# Patient Record
Sex: FEMALE | Race: WHITE | NOT HISPANIC OR LATINO | Employment: FULL TIME | URBAN - METROPOLITAN AREA
[De-identification: names, ages, dates, MRNs, and addresses within clinical notes are randomized per-mention and may not be internally consistent; named-entity substitution may affect disease eponyms.]

---
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
## 2020-12-18 ENCOUNTER — HISTORICAL (OUTPATIENT)
Dept: ADMINISTRATIVE | Facility: HOSPITAL | Age: 20
End: 2020-12-18

## 2020-12-18 LAB
ALBUMIN SERPL-MCNC: 4.9 GM/DL (ref 3.5–5)
ALBUMIN/GLOB SERPL: 1.4 RATIO (ref 1.1–2)
ALP SERPL-CCNC: 78 UNIT/L (ref 40–150)
ALT SERPL-CCNC: 15 UNIT/L (ref 0–55)
AST SERPL-CCNC: 21 UNIT/L (ref 5–34)
BILIRUB SERPL-MCNC: 0.4 MG/DL
BILIRUBIN DIRECT+TOT PNL SERPL-MCNC: 0.2 MG/DL (ref 0–0.5)
BILIRUBIN DIRECT+TOT PNL SERPL-MCNC: 0.2 MG/DL (ref 0–0.8)
BUN SERPL-MCNC: 9.9 MG/DL (ref 7–18.7)
CALCIUM SERPL-MCNC: 11.1 MG/DL (ref 8.4–10.2)
CHLORIDE SERPL-SCNC: 106 MMOL/L (ref 98–107)
CO2 SERPL-SCNC: 27 MMOL/L (ref 22–29)
CREAT SERPL-MCNC: 0.7 MG/DL (ref 0.55–1.02)
ERYTHROCYTE [DISTWIDTH] IN BLOOD BY AUTOMATED COUNT: 11.9 % (ref 11.5–17)
GLOBULIN SER-MCNC: 3.5 GM/DL (ref 2.4–3.5)
GLUCOSE SERPL-MCNC: 94 MG/DL (ref 74–100)
HCT VFR BLD AUTO: 46.6 % (ref 37–47)
HGB BLD-MCNC: 14.8 GM/DL (ref 12–16)
MCH RBC QN AUTO: 28.8 PG (ref 27–31)
MCHC RBC AUTO-ENTMCNC: 31.8 GM/DL (ref 33–36)
MCV RBC AUTO: 90.8 FL (ref 80–94)
PLATELET # BLD AUTO: 370 X10(3)/MCL (ref 130–400)
PMV BLD AUTO: 8.9 FL (ref 9.4–12.4)
POTASSIUM SERPL-SCNC: 4.2 MMOL/L (ref 3.5–5.1)
PROT SERPL-MCNC: 8.4 GM/DL (ref 6.4–8.3)
RBC # BLD AUTO: 5.13 X10(6)/MCL (ref 4.2–5.4)
SODIUM SERPL-SCNC: 141 MMOL/L (ref 136–145)
TSH SERPL-ACNC: 0.93 UIU/ML (ref 0.35–4.94)
WBC # SPEC AUTO: 8.6 X10(3)/MCL (ref 4.5–11.5)

## 2021-05-28 ENCOUNTER — HISTORICAL (OUTPATIENT)
Dept: ADMINISTRATIVE | Facility: HOSPITAL | Age: 21
End: 2021-05-28

## 2021-05-28 LAB
APPEARANCE, UA: CLEAR
BACTERIA #/AREA URNS AUTO: ABNORMAL /HPF
BILIRUB SERPL-MCNC: NEGATIVE MG/DL
BILIRUB UR QL STRIP: NEGATIVE
BLOOD URINE, POC: NORMAL
CLARITY, POC UA: CLEAR
COLOR UR: NORMAL
COLOR, POC UA: YELLOW
GLUCOSE (UA): NEGATIVE
GLUCOSE UR QL STRIP: NEGATIVE
HGB UR QL STRIP: NEGATIVE
HYALINE CASTS #/AREA URNS LPF: ABNORMAL /LPF
KETONES UR QL STRIP: NEGATIVE
KETONES UR QL STRIP: NEGATIVE
LEUKOCYTE EST, POC UA: NEGATIVE
LEUKOCYTE ESTERASE UR QL STRIP: NEGATIVE
NITRITE UR QL STRIP: NEGATIVE
NITRITE, POC UA: NEGATIVE
PH UR STRIP: 7 [PH] (ref 4.5–8)
PH, POC UA: 7
POC BETA-HCG (QUAL): NEGATIVE
PROT UR QL STRIP: NEGATIVE
PROTEIN, POC: NEGATIVE
RBC #/AREA URNS AUTO: ABNORMAL /HPF
SP GR UR STRIP: 1.02 (ref 1–1.03)
SPECIFIC GRAVITY, POC UA: 1.03
SQUAMOUS #/AREA URNS LPF: >100 /LPF
UROBILINOGEN UR STRIP-ACNC: NORMAL
UROBILINOGEN, POC UA: NORMAL
WBC #/AREA URNS AUTO: ABNORMAL /HPF

## 2021-05-31 LAB — FINAL CULTURE: NO GROWTH

## 2021-06-14 ENCOUNTER — HISTORICAL (OUTPATIENT)
Dept: ADMINISTRATIVE | Facility: HOSPITAL | Age: 21
End: 2021-06-14

## 2021-06-14 LAB
APPEARANCE, UA: CLEAR
BACTERIA SPEC CULT: NORMAL /HPF
BILIRUB UR QL STRIP: NEGATIVE
COLOR UR: YELLOW
GLUCOSE (UA): NEGATIVE
HGB UR QL STRIP: NEGATIVE
KETONES UR QL STRIP: NEGATIVE
LEUKOCYTE ESTERASE UR QL STRIP: NEGATIVE
NITRITE UR QL STRIP: NEGATIVE
PH UR STRIP: 6.5 [PH] (ref 5–9)
PROT UR QL STRIP: NEGATIVE
RBC #/AREA URNS HPF: NORMAL /[HPF]
SP GR UR STRIP: 1.02 (ref 1–1.03)
SQUAMOUS EPITHELIAL, UA: NORMAL /HPF (ref 0–4)
UROBILINOGEN UR STRIP-ACNC: 1
WBC #/AREA URNS HPF: NORMAL /[HPF]

## 2021-06-16 LAB — FINAL CULTURE: NORMAL

## 2021-09-28 ENCOUNTER — HISTORICAL (OUTPATIENT)
Dept: LAB | Facility: HOSPITAL | Age: 21
End: 2021-09-28

## 2021-09-28 LAB — SARS-COV-2 RNA RESP QL NAA+PROBE: NOT DETECTED

## 2021-10-12 ENCOUNTER — HISTORICAL (OUTPATIENT)
Dept: ADMINISTRATIVE | Facility: HOSPITAL | Age: 21
End: 2021-10-12

## 2021-12-17 LAB
INFLUENZA A ANTIGEN, POC: NEGATIVE
INFLUENZA B ANTIGEN, POC: NEGATIVE
RAPID GROUP A STREP (OHS): NEGATIVE
SARS-COV-2 RNA RESP QL NAA+PROBE: NEGATIVE

## 2022-04-11 ENCOUNTER — HISTORICAL (OUTPATIENT)
Dept: ADMINISTRATIVE | Facility: HOSPITAL | Age: 22
End: 2022-04-11

## 2022-04-25 VITALS
HEIGHT: 63 IN | DIASTOLIC BLOOD PRESSURE: 83 MMHG | BODY MASS INDEX: 21.09 KG/M2 | OXYGEN SATURATION: 98 % | SYSTOLIC BLOOD PRESSURE: 126 MMHG | WEIGHT: 119.06 LBS

## 2022-05-05 NOTE — HISTORICAL [facility]
This is a historical note converted from [place]. Formatting and pictures may have been removed.  Please reference Clint for original formatting and attached multimedia. Chief Complaint  low back and ab discomfort x 6 days discolored red mucus in urine, low appetite also  History of Present Illness  20-year-old female presents urgent care?with several days of vague?abdominal discomfort,?noncolicky,?occasional low back pain that is nonpositional, thinks she?saw red mucus in her urine?recently, feels fatigued.? She is a  at the main campus.? States she was late on a Depo-Provera shot?recently,?had a negative home UPT?1 week ago.? She denies pelvic discomfort, no vaginal bleeding or discharge, no vomiting or diarrhea,?no dyspepsia?or pyrosis.? No cardiorespiratory symptoms.? She has had no fever.? States she is low risk for STD.  Chart reviewed  Review of Systems  Constitutional: negative except as stated in HPI  Eye: negative except as stated in HPI  ENT: negative except as stated in HPI  Respiratory: negative except as stated in HPI  Cardiovascular: negative except as stated in HPI  Gastrointestinal: negative except as stated in HPI  Genitourinary: negative except as stated in HPI  Hema/Lymph: negative except as stated in HPI  Endocrine: negative except as stated in HPI  Immunologic: negative except as stated in HPI  Musculoskeletal: negative except as stated in HPI  Integumentary: negative except as stated in HPI  Neurologic: negative except as stated in HPI  ?  Physical Exam  Vitals & Measurements  T:?37.0? ?C (Oral)? HR:?84(Peripheral)? BP:?124/77? SpO2:?98%?  HT:?160.00?cm? WT:?53.900?kg? BMI:?21.05?  VITAL SIGNS: ?Reviewed. ? ?  GENERAL:?In no apparent distress  HEAD: ?No signs of head trauma  EYES:?Pupils are equal. ?Extraocular motions intact???  CHEST:?Clear breath sounds bilaterally. ?No wheezes  CARDIAC:?Regular  ABDOMEN:?Soft, nontender, nondistended, no rebound or guarding, no masses palpated,  negative Rovsing, negative obturator, negative psoas, negative heel strike. ?No CVA tenderness  MUSCULOSKELETAL:?Good range of motion of all major joints. Extremities without edema.? No musculoskeletal tenderness of back  NEUROLOGIC EXAM:?Alert and oriented x 3. ?No focal sensory or strength deficits. ? Speech normal. ?Follows commands  PSYCHIATRIC:?Cooperative, appropriate mood and affect  SKIN:?No visualized rash  ?  ?  ?  ?  Assessment/Plan  Abdominal pain?R10.9  Ordered:  Office/Outpatient Visit Level 4 [facility] [id number] PC, Abdominal pain, 05/28/21 20:20:00 CDT  Urinalysis with Microscopic if Indicated, Stat collect, Urine, 05/28/21 20:25:00 CDT, Stop date 05/28/21 20:25:00 CDT, Nurse collect, Abdominal pain  Urine Culture [id number], Stat collect, 05/28/21 20:20:00 CDT, Urine, Nurse collect, Stop date 05/28/21 20:20:00 CDT, Abdominal pain  ?  Discussed negative UPT.? Reviewed?urine dip results showing?trace blood.? Discussed potential etiologies of?symptoms.? Abdominal exam is benign.? After discussion, pelvic exam was deferred.? We will send urine for micro and culture?and notify?of any significant abnormalities.? I encouraged her to monitor symptoms. ?We discussed ER precautions in regards to?abdominal pain/vaginal bleeding/hematuria.? She will attempt to make a follow-up appointment?with Dr. Way?if this is not resolving?in the next several days.? She understands to return to urgent care for any worsening?or inability to see PCP.? Would need?a pelvic exam at that point.   Problem List/Past Medical History  Ongoing  No chronic problems  Historical  Fever  Procedure/Surgical History  Gallbladder operation (2017)  wisdom teeth (2017)   Medications  MEDROXYPROGESTERONE 150MG/ML PF SYR, IM, q3mo  Mobic 7.5 mg oral tablet, 7.5 mg= 1 tab(s), Oral, Daily  Allergies  Seafood?(Itchy)  sulfa drugs?(Hives)  Social History  Abuse/Neglect  No, 08/07/2019  Alcohol  Never, 07/30/2018  Exercise  Exercise duration: 60. Exercise  frequency: 3-4 times/week. Exercise type: Walking., 05/28/2021  Nutrition/Health  Regular, 05/28/2021  Spiritual/Cultural  Worship, 05/28/2021  Substance Use  Never, 07/30/2018  Tobacco - Denies Tobacco Use, 05/06/2016  Never (less than 100 in lifetime), N/A, 08/07/2019  Family History  Family history is negative  Immunizations  Vaccine Date Status   meningococcal conjugate vaccine 07/30/2018 Given   Health Maintenance  Health Maintenance  ???Pending?(in the next year)  ??? ??OverDue  ??? ? ? ?Influenza Vaccine due??10/01/20??and every 1??day(s)  ??? ? ? ?Alcohol Misuse Screening due??01/02/21??and every 1??year(s)  ??? ??Due?  ??? ? ? ?ADL Screening due??05/28/21??and every 1??year(s)  ??? ? ? ?Depression Screening due??05/28/21??Unknown Frequency  ??? ? ? ?Tetanus Vaccine due??05/28/21??and every 10??year(s)  ??? ??Due In Future?  ??? ? ? ?Obesity Screening not due until??01/01/22??and every 1??year(s)  ??? ? ? ?TB Skin Test not due until??04/16/22??and every 1??year(s)  ???Satisfied?(in the past 1 year)  ??? ??Satisfied?  ??? ? ? ?Blood Pressure Screening on??05/28/21.??Satisfied by Alvaro Law LPN  ??? ? ? ?Body Mass Index Check on??05/28/21.??Satisfied by Alvaro Law LPN  ??? ? ? ?Obesity Screening on??05/28/21.??Satisfied by Alvaro Law LPN  ??? ? ? ?TB Skin Test on??04/16/21.  ?  Lab Results  Test Name Test Result Date/Time   Urine Color Urine Dipstick Yellow 05/28/2021 19:47 CDT   Urine Appearance Urine Dipstick Clear 05/28/2021 19:47 CDT   pH Urine Dipstick 7 05/28/2021 19:47 CDT   Specific Gravity Urine Dipstick 1.030 05/28/2021 19:47 CDT   Blood Urine Dipstick Trace 05/28/2021 19:47 CDT   Glucose Urine Dipstick Negative 05/28/2021 19:47 CDT   Ketones Urine Dipstick Negative 05/28/2021 19:47 CDT   Protein Urine Dipstick Negative 05/28/2021 19:47 CDT   Bilirubin Urine Dipstick Negative 05/28/2021 19:47 CDT   Urobilinogen Urine Dipstick 0.2 mg/dl 05/28/2021 19:47 CDT   Leukocytes Urine Dipstick  Negative 05/28/2021 19:47 CDT   Nitrite Urine Dipstick Negative 05/28/2021 19:47 CDT   U beta hCG Ql POC Negative 05/28/2021 19:59 CDT       Patient had called to question regarding the urine results?and she had noted?blood in the urine,?reviewed chart with patient?discussed?what Dr. Siddiqui has discussed with the patient regarding the traces of blood,?inform patient that her culture?urine?came back negative for any growth,?patient states she had missed her?Depo shot?2 weeks?to 3 weeks?and there could be a possibility?her restarting her?menses.? Also she was encouraged?to monitor her symptoms?and discussed regarding going to emergency room report department if symptoms get worse?also reminded her that she needs to make a follow-up appointment with Dr. Way?if her symptoms did not resolve?since her visit.? Patient states she continues to have abdominal pain off-and-on.? Reinforced?instructions?that was given to her?on May 28 visit?and for her to follow-up with her PCP?and GYN.? And also instructed patient?she can go to emergency room department?if needed.? Questions elicited and answered,?patient verbalized understanding?of discussion.? Reinforced?she is very welcome to come back to the clinic for further evaluation?and testing?or to go to emergency room department.

## 2022-05-05 NOTE — HISTORICAL [facility]
This is a historical note converted from [place]. Formatting and pictures may have been removed.  Please reference Clint for original formatting and attached multimedia. Chief Complaint  PT fell down stairs 3 days and hurt right ankle ( present swelling )  History of Present Illness  20-year-old present to clinic with concerns of right ankle pain?and swelling since 3 days.? Accidentally fell down the stairs. ?No head injury no loss of consciousness.? Continue to walk full weightbearing.? Noticing pain and swelling worse?both laterally and medially.? Works at the hospital setting.? Denies tingling numbness or weakness?to foot or toes  Review of Systems  Constitutional : No fever, no fatigue  Neck : Negative except HPI  Respiratory : No shortness of breath, no wheezing  Cardiovascular : No chest pain  Musculoskeletal : Negative except HPI  Integumentary : No rash, no abnormal lesion  Neurological : Negative for tingling numbness and weakness  Physical Exam  Vitals & Measurements  T:?36.6? ?C (Oral)? HR:?81(Peripheral)? RR:?17? BP:?124/76? SpO2:?100%?  HT:?160.00?cm? WT:?53.000?kg? BMI:?20.7?  General : Alert and oriented, No apparent distress, Afebrile  Neck -: supple, Non Tender  Respiratory :Lungs are clear to auscultate, Breath sounds are equal  Cardiovascular : Normal rate, normal volume pulse bilateral  Musculoskeletal :?Right ankle?swollen both medially and laterally. ?Tender to palpate.? Very mild bruising.? Right foot nontender to palpate. ?Right toes full range of motion present.? Dorsalis pedis 2+ bilateral  Integumentary :?Warm, Dry?  Neurologic : Alert and Oriented X 4, sensations intact, motor intact  Assessment/Plan  1.?Right ankle sprain?S93.401A  ?Reviewed the x-rays, no concerns of fracture or dislocation.? Radiology final results will be monitored and reported  Discussed in detail on sprain, condition and course.? Rest, ice, compression and elevation  Alternate Tylenol and ibuprofen for pain and  discomfort. ?Activities as tolerated  Call or return to clinic for any questions  Work excuse for 2 days  Ordered:  Office/Outpatient Visit Level 3 Established [id number] PC, Right ankle sprain, [facility]-RR, 10/12/21 15:12:00 CDT  XR Ankle Right Minimum 3 Views, Routine, 10/12/21 14:59:00 CDT, None, Ambulatory, Rad Type, Right ankle sprain, Not Scheduled, 10/12/21 14:59:00 CDT  ?   Problem List/Past Medical History  Ongoing  No chronic problems  Historical  Fever  Procedure/Surgical History  Gallbladder operation (2017)  wisdom teeth (2017)   Medications  MEDROXYPROGESTERONE 150MG/ML PF SYR, IM, q3mo  Mobic 7.5 mg oral tablet, 7.5 mg= 1 tab(s), Oral, Daily  Allergies  Seafood?(Itchy)  sulfa drugs?(Hives)  Social History  Abuse/Neglect  No, 10/12/2021  Alcohol  Never, 07/30/2018  Exercise  Exercise duration: 60. Exercise frequency: 3-4 times/week. Exercise type: Walking., 05/28/2021  Nutrition/Health  Regular, 05/28/2021  Spiritual/Cultural  Jehovah's witness, 05/28/2021  Substance Use  Never, 07/30/2018  Tobacco - Denies Tobacco Use, 05/06/2016  Never (less than 100 in lifetime), N/A, 10/12/2021  Family History  Family history is negative  Immunizations  Vaccine Date Status   meningococcal conjugate vaccine 07/30/2018 Given   Health Maintenance  Health Maintenance  ???Pending?(in the next year)  ??? ??OverDue  ??? ? ? ?Alcohol Misuse Screening due??01/02/21??and every 1??year(s)  ??? ??Due?  ??? ? ? ?ADL Screening due??10/12/21??and every 1??year(s)  ??? ? ? ?Depression Screening due??10/12/21??Unknown Frequency  ??? ? ? ?Tetanus Vaccine due??10/12/21??and every 10??year(s)  ??? ??Due In Future?  ??? ? ? ?Obesity Screening not due until??01/01/22??and every 1??year(s)  ??? ? ? ?TB Skin Test not due until??04/16/22??and every 1??year(s)  ???Satisfied?(in the past 1 year)  ??? ??Satisfied?  ??? ? ? ?Blood Pressure Screening on??10/12/21.??Satisfied by Guicho Biggs  ??? ? ? ?Body Mass Index Check on??10/12/21.??Satisfied by  Guicho Biggs  ??? ? ? ?Influenza Vaccine on??10/12/21.??Satisfied by Guicho Biggs  ??? ? ? ?Obesity Screening on??10/12/21.??Satisfied by Guicho Biggs  ??? ? ? ?TB Skin Test on??04/16/21.  ?
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
## 2022-05-05 NOTE — HISTORICAL [facility] CERN[facility]
This is a historical note converted from Clint. Formatting and pictures may have been removed.  Please reference Clint for original formatting and attached multimedia. Chief Complaint  mid chest pain, sob comes and goes  History of Present Illness  Pt seen at [facility] 1/14/19 with 2 week history of nasal congestion, headache.? Dx with sinusitis and treatment prednisone and Augmentin.? Pt developed fever, cough and sore throat developed following.? Seen at [facility] 1/26/19 and tested positive for strep.? Tx with Azithromycin and Dexamethasone 4mg IM.? Patient reports symptoms have resolved although?persistent upper?chest pain?and?which she describes as?sensation of a?prominent?heartbeat.? These symptoms started?with an initial illness.? Denies? syncope or near syncope.? Very subtle shortness of breath.  Review of Systems  Constitutional:?No weight loss, no fever, no fatigue, no chills, no night sweats,?no weakness  Eyes:?No blurred vision,?no redness,?no drainage,?no ocular?pain  HEENT:?No sore throat,?no ear pain, no sinus pressure, no nasal congestion, no rhinorrhea, no postnasal drip  Respiratory:?No cough, no wheezing, no sputum production, shortness of breath  Cardiovascular:?chest pain, no palpitations, no dyspnea on exertion,?no orthopnea  Gastrointestinal:?No nausea, no vomiting, no abdominal pain, no diarrhea,?no constipation, no melena,?no hematochezia  Genitourinary:?No dysuria, no hematuria, no frequency, no urgency, no incontinence, no discharge  Musculoskeletal:?No myalgias, no arthralgias, no weakness, no joint effusion, no edema  Integumentary:?No rashes, no hives, no itching, no lesions, no jaundice  Neurologic:?No headaches, no numbness, no tingling, no weakness, no dizziness  Psychiatric:?No anxiety, no irritability, no depression,?no suicidal ideations, no?homicidal ideations,?no delusions, no hallucinations  Endocrine:?No polyuria, no polydipsia, no polyphagia  Hematology:?No bruising, no  lymphadenopathy,?no paleness  ?  Physical Exam  Vitals & Measurements  HR:?90(Peripheral)? BP:?98/66?  HT:?157?cm? WT:?42.2?kg? BMI:?17.12?  General:?Well developed, Well-nourished, in No acute distress, A&O x 4  Eye:?PERRLA, EOMI, Clear conjunctiva, Eyelids unremarkable  Ears:?Bilateral EAC clear?and?Bilateral TM clear  Nose:? Mucosa normal, No rhinorrhea, No lesions  O/P:??No?erythema,?No tonsillar hypertrophy,?No tonsillar exudates,?No cobblestoning  Neck:?Soft, Nontender, No thyromegaly, Full range of motion, No cervical lymphadenopathy, No JVD  Cardiovascular:?Regular rate and rhythm, No murmurs, No gallops, No rubs  Respiratory:?Clear to auscultation bilaterally, No wheezes, No rhonchi, No?crackles  Abdomen:? Soft, Nontender, No hepatosplenomegaly, Normal and equal bowel sounds, No masses, No rebound, No guarding  Musculoskeletal:?Parasternal tenderness?over bilateral second?costosternal junction, FROM, No joint abnormality, No clubbing, No cyanosis,No?edema  Neurologic:? No motor or sensory deficits, Reflexes 2+ throughout, CN II-XII grossly intact  Integumentary:?No rashes or skin lesions  ?  Assessment/Plan  1.?Chest wall pain?R07.89  ?Chest x-ray-unremarkable for any significant abnormalities  Consistent with?costochondritis  Naproxen 500 mg twice daily?for 5-7 days  If?symptoms fail to improve or worsen?patient to contact my office  Ordered:  Office/Outpatient Visit Level 4 Established [id number] PC, Chest wall pain  Palpitations, [facility], 02/04/19 14:24:00 CST  XR Chest 2 Views, Routine, 02/04/19 13:59:00 CST, Other (please specify), None, Ambulatory, Rad Type, Chest wall pain, [facility], 02/04/19 13:59:00 CST  ?  2.?Palpitations?R00.2  ?EKG-normal sinus rhythm without any significant ST or T wave changes  Ordered:  Office/Outpatient Visit Level 4 Established [id number] PC, Chest wall pain  Palpitations, [facility], 02/04/19 14:24:00 CST  ?  Orders:  naproxen, 500 mg = 1 tab(s),  Oral, BID, X 7 day(s), # 30 tab(s), 0 Refill(s), Pharmacy: [facility] [id number]   Problem List/Past Medical History  Ongoing  No chronic problems  Historical  Fever  Procedure/Surgical History  Gallbladder operation (2017)  wisdom teeth (2017)   Medications  MEDROXYPROGESTERONE 150MG/ML PF SYR, IM, q3mo  naproxen 500 mg oral tablet, 500 mg= 1 tab(s), Oral, BID  Allergies  Seafood?(Itchy)  sulfa drugs?(Hives)  Social History  Alcohol  Never, 07/30/2018  Substance Abuse  Never, 07/30/2018  Tobacco - Denies Tobacco Use, 05/06/2016  Never (less than 100 in lifetime), N/A, 02/04/2019  Never (less than 100 in lifetime), N/A, 01/26/2019  Family History  Family history is negative  Immunizations  Vaccine Date Status   meningococcal conjugate vaccine 07/30/2018 Given   Health Maintenance  Health Maintenance  ???Pending?(in the next year)  ??? ??Due?  ??? ? ? ?ADL Screening due??02/04/19??and every 1??year(s)  ??? ? ? ?Alcohol Misuse Screening due??02/04/19??and every 1??year(s)  ??? ? ? ?Depression Screening due??02/04/19??and every?  ???Satisfied?(in the past 1 year)  ??? ??Satisfied?  ??? ? ? ?Blood Pressure Screening on??02/04/19.??Satisfied by Jennie Palmer LPN  ??? ? ? ?Body Mass Index Check on??02/04/19.??Satisfied by SYSTEM  ??? ? ? ?Influenza Vaccine on??02/04/19.??Satisfied by Jennie Palmer LPN  ??? ? ? ?Obesity Screening on??02/04/19.??Satisfied by Jennie Palmer LPN  ?  ?

## 2022-09-21 ENCOUNTER — HISTORICAL (OUTPATIENT)
Dept: ADMINISTRATIVE | Facility: HOSPITAL | Age: 22
End: 2022-09-21
Payer: COMMERCIAL

## 2022-12-06 ENCOUNTER — OFFICE VISIT (OUTPATIENT)
Dept: URGENT CARE | Facility: CLINIC | Age: 22
End: 2022-12-06
Payer: COMMERCIAL

## 2022-12-06 VITALS
OXYGEN SATURATION: 100 % | DIASTOLIC BLOOD PRESSURE: 83 MMHG | WEIGHT: 120 LBS | RESPIRATION RATE: 17 BRPM | HEART RATE: 70 BPM | HEIGHT: 63 IN | TEMPERATURE: 99 F | BODY MASS INDEX: 21.26 KG/M2 | SYSTOLIC BLOOD PRESSURE: 116 MMHG

## 2022-12-06 DIAGNOSIS — H61.23 BILATERAL IMPACTED CERUMEN: Primary | ICD-10-CM

## 2022-12-06 PROCEDURE — 1160F PR REVIEW ALL MEDS BY PRESCRIBER/CLIN PHARMACIST DOCUMENTED: ICD-10-PCS | Mod: CPTII,,, | Performed by: FAMILY MEDICINE

## 2022-12-06 PROCEDURE — 3079F DIAST BP 80-89 MM HG: CPT | Mod: CPTII,,, | Performed by: FAMILY MEDICINE

## 2022-12-06 PROCEDURE — 3008F PR BODY MASS INDEX (BMI) DOCUMENTED: ICD-10-PCS | Mod: CPTII,,, | Performed by: FAMILY MEDICINE

## 2022-12-06 PROCEDURE — 1159F PR MEDICATION LIST DOCUMENTED IN MEDICAL RECORD: ICD-10-PCS | Mod: CPTII,,, | Performed by: FAMILY MEDICINE

## 2022-12-06 PROCEDURE — 3074F SYST BP LT 130 MM HG: CPT | Mod: CPTII,,, | Performed by: FAMILY MEDICINE

## 2022-12-06 PROCEDURE — 99213 OFFICE O/P EST LOW 20 MIN: CPT | Mod: ,,, | Performed by: FAMILY MEDICINE

## 2022-12-06 PROCEDURE — 1159F MED LIST DOCD IN RCRD: CPT | Mod: CPTII,,, | Performed by: FAMILY MEDICINE

## 2022-12-06 PROCEDURE — 3008F BODY MASS INDEX DOCD: CPT | Mod: CPTII,,, | Performed by: FAMILY MEDICINE

## 2022-12-06 PROCEDURE — 3079F PR MOST RECENT DIASTOLIC BLOOD PRESSURE 80-89 MM HG: ICD-10-PCS | Mod: CPTII,,, | Performed by: FAMILY MEDICINE

## 2022-12-06 PROCEDURE — 3074F PR MOST RECENT SYSTOLIC BLOOD PRESSURE < 130 MM HG: ICD-10-PCS | Mod: CPTII,,, | Performed by: FAMILY MEDICINE

## 2022-12-06 PROCEDURE — 99213 PR OFFICE/OUTPT VISIT, EST, LEVL III, 20-29 MIN: ICD-10-PCS | Mod: ,,, | Performed by: FAMILY MEDICINE

## 2022-12-06 PROCEDURE — 1160F RVW MEDS BY RX/DR IN RCRD: CPT | Mod: CPTII,,, | Performed by: FAMILY MEDICINE

## 2022-12-06 NOTE — PROGRESS NOTES
"Subjective:       Patient ID: Andreina Dotson is a 22 y.o. female.    Vitals:  height is 5' 3" (1.6 m) and weight is 54.4 kg (120 lb). Her temperature is 98.5 °F (36.9 °C). Her blood pressure is 116/83 and her pulse is 70. Her respiration is 17 and oxygen saturation is 100%.     Chief Complaint: Otalgia (Bilateral ear pain x 3 months )    HPI:  22-year-old female present to clinic with concerns of bilateral ear pain on and off for 3 months.  Worse after bath.  Denies swimming.  Deals with mild allergies.  No fever, no sore throat, no toothache.  No jaw pain.    ROS  :  Constitutional : No fever  Neck : No pain  HEENT : No sore throat, No difficulty swallowing. As per HPI  Respiratory : No coughing, no shortness of breath  Cardiovascular : No chest pain  Integumentary : No skin rash  Neurological : No tingling, numbness or weakness   Objective:      Physical Exam    General : Alert and Oriented, No apparent distress, afebrile  Neck - supple  HENT : Oropharynx no redness or swelling.  Bilateral ear canal cerumen impaction  After irrigation: Bilateral TM intact no redness   Respiratory : Bilateral equal breath sounds, nonlabored respirations  Cardiovascular : Rate, rhythm regular, normal volume pulse, no murmur  Integumentary : Warm, Dry and no rash    Cerumen Removal : Informed verbal and written consent by Patient  Treatment options discussed. Consented for ear irrigation  Performed By: Self and Staff Nurse assist  Indication: Impaction, ear fullness  Location:  Bilateral ear canal cerumen impaction, appears chronic  Preparation and Technique:  Positioned: Sitting upright  Method: Lighted Otoscope used with Curette, irrigation  Irrigation device: Ear wash system with syringe  Irrigated with: Warm normal saline  Results:  Cerumen removal bilateral   Procedure tolerated: Well  Complications: None  Total Time: 15 -20 minutes   Assessment:       1. Bilateral impacted cerumen          Plan:     Cerumen removal today bilateral " ear canal.  Discussed the physical findings before and after.  Tylenol or ibuprofen for pain and discomfort.  Call or return to clinic for any questions    Bilateral impacted cerumen  -     Ear wax removal

## 2022-12-06 NOTE — PATIENT INSTRUCTIONS
Cerumen removal today bilateral ear canal.  Discussed the physical findings before and after.  Tylenol or ibuprofen for pain and discomfort.  Call or return to clinic for any questions

## 2023-07-20 PROCEDURE — 82607 VITAMIN B-12: CPT | Performed by: FAMILY MEDICINE

## 2023-07-20 PROCEDURE — 82746 ASSAY OF FOLIC ACID SERUM: CPT | Performed by: FAMILY MEDICINE

## 2023-07-20 PROCEDURE — 84703 CHORIONIC GONADOTROPIN ASSAY: CPT | Performed by: FAMILY MEDICINE

## 2024-02-06 ENCOUNTER — LAB VISIT (OUTPATIENT)
Dept: LAB | Facility: HOSPITAL | Age: 24
End: 2024-02-06
Attending: OBSTETRICS & GYNECOLOGY
Payer: COMMERCIAL

## 2024-02-06 DIAGNOSIS — Z34.01 ENCOUNTER FOR SUPERVISION OF NORMAL FIRST PREGNANCY IN FIRST TRIMESTER: Primary | ICD-10-CM

## 2024-02-06 LAB
ERYTHROCYTE [DISTWIDTH] IN BLOOD BY AUTOMATED COUNT: 11.7 % (ref 11.5–17)
GROUP & RH: NORMAL
HCT VFR BLD AUTO: 35.6 % (ref 37–47)
HGB BLD-MCNC: 11.8 G/DL (ref 12–16)
INDIRECT COOMBS: NORMAL
MCH RBC QN AUTO: 29.5 PG (ref 27–31)
MCHC RBC AUTO-ENTMCNC: 33.1 G/DL (ref 33–36)
MCV RBC AUTO: 89 FL (ref 80–94)
NRBC BLD AUTO-RTO: 0 %
PLATELET # BLD AUTO: 313 X10(3)/MCL (ref 130–400)
PMV BLD AUTO: 8.4 FL (ref 7.4–10.4)
RBC # BLD AUTO: 4 X10(6)/MCL (ref 4.2–5.4)
SPECIMEN OUTDATE: NORMAL
WBC # SPEC AUTO: 11.15 X10(3)/MCL (ref 4.5–11.5)

## 2024-02-06 PROCEDURE — 87086 URINE CULTURE/COLONY COUNT: CPT

## 2024-02-06 PROCEDURE — 86803 HEPATITIS C AB TEST: CPT

## 2024-02-06 PROCEDURE — 86762 RUBELLA ANTIBODY: CPT

## 2024-02-06 PROCEDURE — 85027 COMPLETE CBC AUTOMATED: CPT

## 2024-02-06 PROCEDURE — 87340 HEPATITIS B SURFACE AG IA: CPT

## 2024-02-06 PROCEDURE — 36415 COLL VENOUS BLD VENIPUNCTURE: CPT

## 2024-02-06 PROCEDURE — 86901 BLOOD TYPING SEROLOGIC RH(D): CPT | Performed by: OBSTETRICS & GYNECOLOGY

## 2024-02-06 PROCEDURE — 87389 HIV-1 AG W/HIV-1&-2 AB AG IA: CPT

## 2024-02-07 LAB
HBV SURFACE AG SERPL QL IA: NONREACTIVE
HCV AB SERPL QL IA: NONREACTIVE
HIV 1+2 AB+HIV1 P24 AG SERPL QL IA: NONREACTIVE

## 2024-02-08 LAB — BACTERIA UR CULT: NO GROWTH

## 2024-02-09 LAB
RUBV IGG SERPL IA-ACNC: 0.9
RUBV IGG SERPL QL IA: NORMAL

## 2024-07-05 ENCOUNTER — HOSPITAL ENCOUNTER (EMERGENCY)
Facility: HOSPITAL | Age: 24
Discharge: HOME | End: 2024-07-05
Attending: OBSTETRICS & GYNECOLOGY
Payer: COMMERCIAL

## 2024-07-05 VITALS
WEIGHT: 140 LBS | SYSTOLIC BLOOD PRESSURE: 105 MMHG | BODY MASS INDEX: 24.8 KG/M2 | RESPIRATION RATE: 18 BRPM | TEMPERATURE: 98 F | DIASTOLIC BLOOD PRESSURE: 73 MMHG | OXYGEN SATURATION: 99 % | HEART RATE: 102 BPM

## 2024-07-05 LAB
ALBUMIN SERPL-MCNC: 2.7 G/DL (ref 3.5–5)
ALBUMIN/GLOB SERPL: 0.8 RATIO (ref 1.1–2)
ALP SERPL-CCNC: 138 UNIT/L (ref 40–150)
ALT SERPL-CCNC: 9 UNIT/L (ref 0–55)
ANION GAP SERPL CALC-SCNC: 11 MEQ/L
AST SERPL-CCNC: 15 UNIT/L (ref 5–34)
BACTERIA #/AREA URNS AUTO: ABNORMAL /HPF
BASOPHILS # BLD AUTO: 0.03 X10(3)/MCL
BASOPHILS NFR BLD AUTO: 0.2 %
BILIRUB SERPL-MCNC: 0.3 MG/DL
BILIRUB UR QL STRIP.AUTO: NEGATIVE
BUN SERPL-MCNC: 4.4 MG/DL (ref 7–18.7)
CALCIUM SERPL-MCNC: 9.1 MG/DL (ref 8.4–10.2)
CHLORIDE SERPL-SCNC: 108 MMOL/L (ref 98–107)
CLARITY UR: CLEAR
CO2 SERPL-SCNC: 18 MMOL/L (ref 22–29)
COLOR UR AUTO: COLORLESS
CREAT SERPL-MCNC: 0.6 MG/DL (ref 0.55–1.02)
CREAT/UREA NIT SERPL: 7
EOSINOPHIL # BLD AUTO: 0.08 X10(3)/MCL (ref 0–0.9)
EOSINOPHIL NFR BLD AUTO: 0.6 %
ERYTHROCYTE [DISTWIDTH] IN BLOOD BY AUTOMATED COUNT: 12.1 % (ref 11.5–17)
GFR SERPLBLD CREATININE-BSD FMLA CKD-EPI: >60 ML/MIN/1.73/M2
GLOBULIN SER-MCNC: 3.4 GM/DL (ref 2.4–3.5)
GLUCOSE SERPL-MCNC: 107 MG/DL (ref 74–100)
GLUCOSE UR QL STRIP: NORMAL
HCT VFR BLD AUTO: 30.9 % (ref 37–47)
HGB BLD-MCNC: 10.3 G/DL (ref 12–16)
HGB UR QL STRIP: NEGATIVE
IMM GRANULOCYTES # BLD AUTO: 0.28 X10(3)/MCL (ref 0–0.04)
IMM GRANULOCYTES NFR BLD AUTO: 2.2 %
KETONES UR QL STRIP: NEGATIVE
LEUKOCYTE ESTERASE UR QL STRIP: 25
LYMPHOCYTES # BLD AUTO: 1.86 X10(3)/MCL (ref 0.6–4.6)
LYMPHOCYTES NFR BLD AUTO: 14.4 %
MCH RBC QN AUTO: 30.1 PG (ref 27–31)
MCHC RBC AUTO-ENTMCNC: 33.3 G/DL (ref 33–36)
MCV RBC AUTO: 90.4 FL (ref 80–94)
MONOCYTES # BLD AUTO: 1.43 X10(3)/MCL (ref 0.1–1.3)
MONOCYTES NFR BLD AUTO: 11 %
NEUTROPHILS # BLD AUTO: 9.27 X10(3)/MCL (ref 2.1–9.2)
NEUTROPHILS NFR BLD AUTO: 71.6 %
NITRITE UR QL STRIP: NEGATIVE
NRBC BLD AUTO-RTO: 0 %
PH UR STRIP: 6.5 [PH]
PLATELET # BLD AUTO: 302 X10(3)/MCL (ref 130–400)
PMV BLD AUTO: 8.8 FL (ref 7.4–10.4)
POTASSIUM SERPL-SCNC: 3.8 MMOL/L (ref 3.5–5.1)
PROT SERPL-MCNC: 6.1 GM/DL (ref 6.4–8.3)
PROT UR QL STRIP: NEGATIVE
RBC # BLD AUTO: 3.42 X10(6)/MCL (ref 4.2–5.4)
RBC #/AREA URNS AUTO: ABNORMAL /HPF
SODIUM SERPL-SCNC: 137 MMOL/L (ref 136–145)
SP GR UR STRIP.AUTO: 1.01 (ref 1–1.03)
SQUAMOUS #/AREA URNS LPF: ABNORMAL /HPF
UROBILINOGEN UR STRIP-ACNC: NORMAL
WBC # BLD AUTO: 12.95 X10(3)/MCL (ref 4.5–11.5)
WBC #/AREA URNS AUTO: ABNORMAL /HPF

## 2024-07-05 PROCEDURE — 81001 URINALYSIS AUTO W/SCOPE: CPT | Performed by: OBSTETRICS & GYNECOLOGY

## 2024-07-05 PROCEDURE — 85025 COMPLETE CBC W/AUTO DIFF WBC: CPT | Performed by: OBSTETRICS & GYNECOLOGY

## 2024-07-05 PROCEDURE — 80053 COMPREHEN METABOLIC PANEL: CPT | Performed by: OBSTETRICS & GYNECOLOGY

## 2024-07-05 PROCEDURE — 99284 EMERGENCY DEPT VISIT MOD MDM: CPT

## 2024-07-05 NOTE — ED PROVIDER NOTES
RAYRAY NOTE     Admit Date: 2024  RAYRAY Physician: Arielle Parrish  Primary OBGYN: Dr. rTejo    Chief Complaint   Patient presents with    Swelling    visual changes     Iup at 31.6 with c/o bilateral swelling to ankles, more  on the left than the right. Swelling in hands and face. Reports seeing some spots. Ob is [facility] Course:  Andreina Dotson is a 23 y.o.  at 31w6d presents complaining of lower extremity swelling, occasionally seeing spots and intermittent headaches.  The lower extremity swelling has been present for a month.  Concerns the swelling is more on the left.  Denies pain.  Denies previous injury.  Reports she does not consume excess salty foods.  Reports hydrating with water, Gatorade and body armour.   She did take tylenol yesterday and headache resolved.  Denies h/o blood pressure issues this pregnancy.      This IUP is otherwise uncomplicated.    Patient denies vaginal bleeding, leakage of fluid, contractions, vision changes, RUQ pain, dysuria, fever, and nausea/vomiting.  Fetal Movement: normal.      /79   Pulse 107   Temp 97.9 °F (36.6 °C) (Oral)   Resp 18   Wt 63.5 kg (140 lb)   LMP 2023 (Exact Date)   SpO2 99%   Breastfeeding No   BMI 24.80 kg/m²   Temp:  [97.9 °F (36.6 °C)] 97.9 °F (36.6 °C)  Pulse:  [] 107  Resp:  [18] 18  SpO2:  [98 %-99 %] 99 %  BP: (123)/(79) 123/79    General: in no apparent distress well developed and well nourished  Cardiovascular: regular rate and rhythm  Respiratory: normal effort, no wheezing.   Abdominal: FHT present. Gravid  Extremeties no redness or tenderness in the calves or thighs, no edema, no limitation in range of motion, intact peripheral pulses,  Abhijit's sign negative bilaterally.  No cords palpable.     FHT: Category 1  TOCO: no contractions    Medical Decision Making:  Benign exam.      LABS:  CBC, CMP and UA ordered     ASSESMENT: Andreina Dotson is a 23 y.o.   at 31w6d with lower extremity  swelling, headache.     Discharge Diagnosis/Clinical Impression:   Normotensive with benign exam  We discussed physiologic changes in pregnancy and benign swelling.  Increase hydration and decreased electrolyte drinks.   Add antihistamine and caffeine with headache.     Status:Stable    Disposition:  Discharge pending lab results    Medications:   None    Patient Instructions:   Current Discharge Medication List        CONTINUE these medications which have NOT CHANGED    Details   metoclopramide HCl (REGLAN) 10 MG tablet Take 1 tablet (10 mg total) by mouth 3 (three) times daily with meals.  Qty: 90 tablet, Refills: 1    Associated Diagnoses: Nausea      PNV no.95/ferrous fum/folic ac (PRENATAL ORAL) Take by mouth.      azelastine (ASTELIN) 137 mcg (0.1 %) nasal spray       linaCLOtide (LINZESS) 72 mcg Cap capsule Take 1 capsule (72 mcg total) by mouth before breakfast.  Qty: 30 capsule, Refills: 5    Associated Diagnoses: Constipation, unspecified constipation type; Abdominal bloating      traZODone (DESYREL) 50 MG tablet Take 1 tablet (50 mg total) by mouth every evening. 1/2-1 tab at bedtime  Qty: 30 tablet, Refills: 5    Associated Diagnoses: Insomnia, unspecified type             - Pt was given routine pregnancy instructions including to return to triage if she had any vaginal bleeding (other than spotting for the next 48hrs), any loss of fluid like her water broke, decreased fetal movement, or contractions Q 5min lasting for 2 or more hours. Pt was also instructed to drink copious water. Patient voiced understanding of all these instructions and was subsequently discharged home.      DVT precautions reviewed.  Headache recommendations given.  Increase hydration, decreased sodium and electrolyte drinks.    She will follow up with her primary OB.    Arielle Parrish MD  OB-GYN

## 2024-07-05 NOTE — DISCHARGE INSTRUCTIONS
Please follow up with Dr. Trejo for your scheduled appointment.  Come back to labor and delivery if you have decreased fetal movement, contractions, vaginal bleeding or leaking fluid.

## 2024-07-30 PROCEDURE — 87081 CULTURE SCREEN ONLY: CPT | Performed by: OBSTETRICS & GYNECOLOGY

## 2024-08-09 ENCOUNTER — HOSPITAL ENCOUNTER (EMERGENCY)
Facility: HOSPITAL | Age: 24
Discharge: HOME | End: 2024-08-09
Payer: COMMERCIAL

## 2024-08-09 VITALS
SYSTOLIC BLOOD PRESSURE: 133 MMHG | RESPIRATION RATE: 18 BRPM | HEIGHT: 63 IN | BODY MASS INDEX: 24.98 KG/M2 | HEART RATE: 96 BPM | DIASTOLIC BLOOD PRESSURE: 78 MMHG | WEIGHT: 141 LBS | TEMPERATURE: 98 F

## 2024-08-09 PROCEDURE — 99284 EMERGENCY DEPT VISIT MOD MDM: CPT

## 2024-08-10 NOTE — ED PROVIDER NOTES
Encounter Date: 2024       History     Chief Complaint   Patient presents with    pt c/o ctx q3-5min starting around 1700; denies LOF and vag     Andreina Dotson is a 23 y.o.  female with IUP at 36w6d gestation who c/o contractions. Contractions have been occuring Q5 min and have increased in intensity.    This IUP is uncomplicated.  Patient reports contractions, denies vaginal bleeding, denies LOF.   Fetal Movement: normal.    The history is provided by the patient.     Review of patient's allergies indicates:   Allergen Reactions    Shellfish containing products Itching    Sulfa (sulfonamide antibiotics) Hives     Past Medical History:   Diagnosis Date    Known health problems: none      Past Surgical History:   Procedure Laterality Date    CHOLECYSTECTOMY N/A 2016    EPIDURAL STEROID INJECTION INTO LUMBAR SPINE      WISDOM TOOTH EXTRACTION  2016     Family History   Problem Relation Name Age of Onset    No Known Problems Mother      No Known Problems Father       Social History     Tobacco Use    Smoking status: Never    Smokeless tobacco: Never   Substance Use Topics    Alcohol use: Yes    Drug use: Never     Review of Systems   Constitutional:  Negative for activity change and fatigue.   HENT:  Negative for congestion.    Respiratory:  Negative for shortness of breath.    Cardiovascular:  Negative for chest pain.   Gastrointestinal:  Positive for abdominal pain. Negative for nausea and vomiting.   Genitourinary:  Positive for pelvic pain. Negative for menstrual problem and vaginal discharge.   Musculoskeletal:  Negative for back pain.       Physical Exam     Initial Vitals [24]   BP Pulse Resp Temp SpO2   133/78 96 18 98.1 °F (36.7 °C) --      MAP       --         Physical Exam    Nursing note and vitals reviewed.  Constitutional: She appears well-developed and well-nourished. She is not diaphoretic. No distress.   HENT:   Head: Normocephalic and atraumatic.   Eyes: Conjunctivae and EOM are  normal.   Neck:   Normal range of motion.  Cardiovascular:  Normal rate.           Pulmonary/Chest: No respiratory distress.   Musculoskeletal:         General: Normal range of motion.      Cervical back: Normal range of motion.     Neurological: She is alert and oriented to person, place, and time.   Skin: Skin is warm and dry.   Psychiatric: She has a normal mood and affect.     OB LABOR EXAM:   Pre-Term Labor: No.   Membranes ruptured: No.   Method: Other (see comments).       Dilatation: 1.       Amniotic Fluid Color: no fluid.     Comments: NST Interpretation:   145 BPM baseline  Variability: moderate  Accelerations: present  Decelerations: absent  Contractions: q5-7 minutes    Clinical Impression: Reactive Non-Stress Test         ED Course   Procedures  Labs Reviewed - No data to display       Imaging Results    None          Medications - No data to display  Medical Decision Making  Problems Addressed:  Prolonged latent phase of labor:     Details:   Patient Instructions:   - Pt was given routine pregnancy instructions including to return to triage if she has any vaginal bleeding, any loss of fluid like her water broke, decreased fetal movement, or contractions Q 5min lasting for 2 or more hours.     Pt was also instructed to drink copious amounts of water. Patient voiced understanding of all these instructions and was subsequently discharged home. Tylenol use and maternity belt use discussed. All questions answered. Pt left RAYRAY with good understanding of plan.   Preeclampsia/ROM/labor/fever/decreased FM with FKC precautions discussed, voiced understanding      She will follow up with her primary OB as scheduled         Clinical Impression:  Final diagnoses:  [O63.0] Prolonged latent phase of labor (Primary)          ED Disposition Condition    Discharge Good          ED Prescriptions    None       Follow-up Information    None          Asad Day MD  08/09/24 [id number]

## 2024-08-13 ENCOUNTER — ANESTHESIA (OUTPATIENT)
Dept: OBSTETRICS AND GYNECOLOGY | Facility: HOSPITAL | Age: 24
End: 2024-08-13
Payer: COMMERCIAL

## 2024-08-13 ENCOUNTER — ANESTHESIA EVENT (OUTPATIENT)
Dept: OBSTETRICS AND GYNECOLOGY | Facility: HOSPITAL | Age: 24
End: 2024-08-13
Payer: COMMERCIAL

## 2024-08-13 ENCOUNTER — HOSPITAL ENCOUNTER (INPATIENT)
Facility: HOSPITAL | Age: 24
LOS: 3 days | Discharge: HOME | End: 2024-08-16
Attending: SPECIALIST | Admitting: STUDENT IN AN ORGANIZED HEALTH CARE EDUCATION/TRAINING PROGRAM
Payer: COMMERCIAL

## 2024-08-13 DIAGNOSIS — Z34.90 PREGNANCY, UNSPECIFIED GESTATIONAL AGE: Primary | ICD-10-CM

## 2024-08-13 DIAGNOSIS — Z37.9 NORMAL LABOR: ICD-10-CM

## 2024-08-13 LAB
BASOPHILS # BLD AUTO: 0.04 X10(3)/MCL
BASOPHILS NFR BLD AUTO: 0.3 %
CTP QC/QA: YES
EOSINOPHIL # BLD AUTO: 0.1 X10(3)/MCL (ref 0–0.9)
EOSINOPHIL NFR BLD AUTO: 0.9 %
ERYTHROCYTE [DISTWIDTH] IN BLOOD BY AUTOMATED COUNT: 12.7 % (ref 11.5–17)
GROUP & RH: NORMAL
HCT VFR BLD AUTO: 28.8 % (ref 37–47)
HGB BLD-MCNC: 9.4 G/DL (ref 12–16)
IMM GRANULOCYTES # BLD AUTO: 0.09 X10(3)/MCL (ref 0–0.04)
IMM GRANULOCYTES NFR BLD AUTO: 0.8 %
INDIRECT COOMBS: NORMAL
LYMPHOCYTES # BLD AUTO: 2.05 X10(3)/MCL (ref 0.6–4.6)
LYMPHOCYTES NFR BLD AUTO: 17.5 %
MCH RBC QN AUTO: 28.1 PG (ref 27–31)
MCHC RBC AUTO-ENTMCNC: 32.6 G/DL (ref 33–36)
MCV RBC AUTO: 86.2 FL (ref 80–94)
MONOCYTES # BLD AUTO: 1.35 X10(3)/MCL (ref 0.1–1.3)
MONOCYTES NFR BLD AUTO: 11.5 %
NEUTROPHILS # BLD AUTO: 8.08 X10(3)/MCL (ref 2.1–9.2)
NEUTROPHILS NFR BLD AUTO: 69 %
NRBC BLD AUTO-RTO: 0 %
PLATELET # BLD AUTO: 366 X10(3)/MCL (ref 130–400)
PLATELETS.RETICULATED NFR BLD AUTO: 1.6 % (ref 0.9–11.2)
PMV BLD AUTO: 9.3 FL (ref 7.4–10.4)
RBC # BLD AUTO: 3.34 X10(6)/MCL (ref 4.2–5.4)
RUPTURE OF MEMBRANE: POSITIVE
SPECIMEN OUTDATE: NORMAL
T PALLIDUM AB SER QL: REACTIVE
WBC # BLD AUTO: 11.71 X10(3)/MCL (ref 4.5–11.5)

## 2024-08-13 PROCEDURE — 84112 EVAL AMNIOTIC FLUID PROTEIN: CPT

## 2024-08-13 PROCEDURE — 86900 BLOOD TYPING SEROLOGIC ABO: CPT | Performed by: OBSTETRICS & GYNECOLOGY

## 2024-08-13 PROCEDURE — 86780 TREPONEMA PALLIDUM: CPT | Performed by: STUDENT IN AN ORGANIZED HEALTH CARE EDUCATION/TRAINING PROGRAM

## 2024-08-13 PROCEDURE — 63600175 PHARM REV CODE 636 W HCPCS: Performed by: STUDENT IN AN ORGANIZED HEALTH CARE EDUCATION/TRAINING PROGRAM

## 2024-08-13 PROCEDURE — 86901 BLOOD TYPING SEROLOGIC RH(D): CPT | Performed by: OBSTETRICS & GYNECOLOGY

## 2024-08-13 PROCEDURE — 86850 RBC ANTIBODY SCREEN: CPT | Performed by: OBSTETRICS & GYNECOLOGY

## 2024-08-13 PROCEDURE — 99285 EMERGENCY DEPT VISIT HI MDM: CPT | Mod: 25

## 2024-08-13 PROCEDURE — 85025 COMPLETE CBC W/AUTO DIFF WBC: CPT | Performed by: STUDENT IN AN ORGANIZED HEALTH CARE EDUCATION/TRAINING PROGRAM

## 2024-08-13 PROCEDURE — 11000001 HC ACUTE MED/SURG PRIVATE ROOM

## 2024-08-13 PROCEDURE — 86592 SYPHILIS TEST NON-TREP QUAL: CPT | Performed by: STUDENT IN AN ORGANIZED HEALTH CARE EDUCATION/TRAINING PROGRAM

## 2024-08-13 RX ORDER — OXYTOCIN 10 [USP'U]/ML
10 INJECTION, SOLUTION INTRAMUSCULAR; INTRAVENOUS ONCE AS NEEDED
Status: DISCONTINUED | OUTPATIENT
Start: 2024-08-13 | End: 2024-08-14 | Stop reason: SDUPTHER

## 2024-08-13 RX ORDER — METHYLERGONOVINE MALEATE 0.2 MG/ML
200 INJECTION INTRAVENOUS ONCE AS NEEDED
Status: DISCONTINUED | OUTPATIENT
Start: 2024-08-13 | End: 2024-08-14 | Stop reason: SDUPTHER

## 2024-08-13 RX ORDER — MISOPROSTOL 100 UG/1
800 TABLET ORAL ONCE AS NEEDED
Status: DISCONTINUED | OUTPATIENT
Start: 2024-08-13 | End: 2024-08-14 | Stop reason: SDUPTHER

## 2024-08-13 RX ORDER — OXYTOCIN-SODIUM CHLORIDE 0.9% IV SOLN 30 UNIT/500ML 30-0.9/5 UT/ML-%
95 SOLUTION INTRAVENOUS ONCE
Status: COMPLETED | OUTPATIENT
Start: 2024-08-13 | End: 2024-08-14

## 2024-08-13 RX ORDER — OXYTOCIN-SODIUM CHLORIDE 0.9% IV SOLN 30 UNIT/500ML 30-0.9/5 UT/ML-%
10 SOLUTION INTRAVENOUS ONCE AS NEEDED
Status: DISCONTINUED | OUTPATIENT
Start: 2024-08-13 | End: 2024-08-14 | Stop reason: SDUPTHER

## 2024-08-13 RX ORDER — CARBOPROST TROMETHAMINE 250 UG/ML
250 INJECTION, SOLUTION INTRAMUSCULAR
Status: DISCONTINUED | OUTPATIENT
Start: 2024-08-13 | End: 2024-08-14 | Stop reason: SDUPTHER

## 2024-08-13 RX ORDER — CALCIUM CARBONATE 200(500)MG
500 TABLET,CHEWABLE ORAL 3 TIMES DAILY PRN
Status: DISCONTINUED | OUTPATIENT
Start: 2024-08-13 | End: 2024-08-16 | Stop reason: HOSPADM

## 2024-08-13 RX ORDER — ONDANSETRON HYDROCHLORIDE 2 MG/ML
4 INJECTION, SOLUTION INTRAVENOUS ONCE
Status: DISCONTINUED | OUTPATIENT
Start: 2024-08-14 | End: 2024-08-16 | Stop reason: HOSPADM

## 2024-08-13 RX ORDER — SODIUM CHLORIDE, SODIUM LACTATE, POTASSIUM CHLORIDE, CALCIUM CHLORIDE 600; 310; 30; 20 MG/100ML; MG/100ML; MG/100ML; MG/100ML
INJECTION, SOLUTION INTRAVENOUS CONTINUOUS
Status: DISCONTINUED | OUTPATIENT
Start: 2024-08-13 | End: 2024-08-16 | Stop reason: HOSPADM

## 2024-08-13 RX ORDER — DIPHENHYDRAMINE HYDROCHLORIDE 50 MG/ML
12.5 INJECTION INTRAMUSCULAR; INTRAVENOUS EVERY 4 HOURS PRN
Status: DISCONTINUED | OUTPATIENT
Start: 2024-08-14 | End: 2024-08-16 | Stop reason: HOSPADM

## 2024-08-13 RX ORDER — FAMOTIDINE 10 MG/ML
20 INJECTION INTRAVENOUS ONCE
Status: DISCONTINUED | OUTPATIENT
Start: 2024-08-14 | End: 2024-08-16 | Stop reason: HOSPADM

## 2024-08-13 RX ORDER — ACETAMINOPHEN 325 MG/1
650 TABLET ORAL EVERY 6 HOURS PRN
Status: DISCONTINUED | OUTPATIENT
Start: 2024-08-13 | End: 2024-08-16 | Stop reason: HOSPADM

## 2024-08-13 RX ORDER — OXYTOCIN-SODIUM CHLORIDE 0.9% IV SOLN 30 UNIT/500ML 30-0.9/5 UT/ML-%
10 SOLUTION INTRAVENOUS ONCE
Status: COMPLETED | OUTPATIENT
Start: 2024-08-13 | End: 2024-08-14

## 2024-08-13 RX ORDER — DIPHENOXYLATE HYDROCHLORIDE AND ATROPINE SULFATE 2.5; .025 MG/1; MG/1
2 TABLET ORAL EVERY 6 HOURS PRN
Status: DISCONTINUED | OUTPATIENT
Start: 2024-08-13 | End: 2024-08-14 | Stop reason: SDUPTHER

## 2024-08-13 RX ORDER — EPHEDRINE SULFATE 50 MG/ML
10 INJECTION, SOLUTION INTRAVENOUS EVERY 5 MIN PRN
Status: DISCONTINUED | OUTPATIENT
Start: 2024-08-13 | End: 2024-08-14

## 2024-08-13 RX ORDER — OXYTOCIN-SODIUM CHLORIDE 0.9% IV SOLN 30 UNIT/500ML 30-0.9/5 UT/ML-%
95 SOLUTION INTRAVENOUS ONCE AS NEEDED
Status: DISCONTINUED | OUTPATIENT
Start: 2024-08-13 | End: 2024-08-14 | Stop reason: SDUPTHER

## 2024-08-13 RX ORDER — NALOXONE HCL 0.4 MG/ML
0.4 VIAL (ML) INJECTION SEE ADMIN INSTRUCTIONS
Status: DISCONTINUED | OUTPATIENT
Start: 2024-08-14 | End: 2024-08-16 | Stop reason: HOSPADM

## 2024-08-13 RX ORDER — FENTANYL/BUPIVACAINE/NS/PF 2-1250MCG
PLASTIC BAG, INJECTION (ML) INJECTION CONTINUOUS
Status: DISCONTINUED | OUTPATIENT
Start: 2024-08-14 | End: 2024-08-14

## 2024-08-13 RX ORDER — SODIUM CITRATE AND CITRIC ACID MONOHYDRATE 334; 500 MG/5ML; MG/5ML
30 SOLUTION ORAL ONCE
Status: DISCONTINUED | OUTPATIENT
Start: 2024-08-14 | End: 2024-08-16 | Stop reason: HOSPADM

## 2024-08-13 RX ORDER — ONDANSETRON 4 MG/1
8 TABLET, ORALLY DISINTEGRATING ORAL EVERY 8 HOURS PRN
Status: DISCONTINUED | OUTPATIENT
Start: 2024-08-13 | End: 2024-08-14 | Stop reason: SDUPTHER

## 2024-08-13 RX ORDER — EPHEDRINE SULFATE 50 MG/ML
25 INJECTION, SOLUTION INTRAVENOUS EVERY 30 MIN PRN
Status: DISCONTINUED | OUTPATIENT
Start: 2024-08-13 | End: 2024-08-14

## 2024-08-13 RX ORDER — LIDOCAINE HYDROCHLORIDE 10 MG/ML
10 INJECTION, SOLUTION INFILTRATION; PERINEURAL ONCE AS NEEDED
Status: DISCONTINUED | OUTPATIENT
Start: 2024-08-13 | End: 2024-08-16 | Stop reason: HOSPADM

## 2024-08-13 RX ORDER — OXYTOCIN-SODIUM CHLORIDE 0.9% IV SOLN 30 UNIT/500ML 30-0.9/5 UT/ML-%
0-30 SOLUTION INTRAVENOUS CONTINUOUS
Status: DISCONTINUED | OUTPATIENT
Start: 2024-08-13 | End: 2024-08-14

## 2024-08-13 RX ORDER — SIMETHICONE 80 MG
1 TABLET,CHEWABLE ORAL 4 TIMES DAILY PRN
Status: DISCONTINUED | OUTPATIENT
Start: 2024-08-13 | End: 2024-08-14

## 2024-08-13 RX ADMIN — Medication 2 MILLI-UNITS/MIN: at 11:08

## 2024-08-13 RX ADMIN — SODIUM CHLORIDE, POTASSIUM CHLORIDE, SODIUM LACTATE AND CALCIUM CHLORIDE: 600; 310; 30; 20 INJECTION, SOLUTION INTRAVENOUS at 10:08

## 2024-08-14 VITALS — RESPIRATION RATE: 18 BRPM

## 2024-08-14 LAB
HCT VFR BLD AUTO: 26.4 % (ref 37–47)
HGB BLD-MCNC: 8.8 G/DL (ref 12–16)
RPR SER QL: REACTIVE
RPR SER-TITR: ABNORMAL {TITER}
T PALLIDUM AB SER QL: NONREACTIVE

## 2024-08-14 PROCEDURE — 36415 COLL VENOUS BLD VENIPUNCTURE: CPT | Performed by: OBSTETRICS & GYNECOLOGY

## 2024-08-14 PROCEDURE — 72100003 HC LABOR CARE, EA. ADDL. 8 HRS

## 2024-08-14 PROCEDURE — 86780 TREPONEMA PALLIDUM: CPT | Performed by: OBSTETRICS & GYNECOLOGY

## 2024-08-14 PROCEDURE — 72100002 HC LABOR CARE, 1ST 8 HOURS

## 2024-08-14 PROCEDURE — 25000003 PHARM REV CODE 250: Performed by: OBSTETRICS & GYNECOLOGY

## 2024-08-14 PROCEDURE — 62326 NJX INTERLAMINAR LMBR/SAC: CPT

## 2024-08-14 PROCEDURE — 85018 HEMOGLOBIN: CPT | Performed by: OBSTETRICS & GYNECOLOGY

## 2024-08-14 PROCEDURE — 72200005 HC VAGINAL DELIVERY LEVEL II

## 2024-08-14 PROCEDURE — 63600175 PHARM REV CODE 636 W HCPCS: Performed by: STUDENT IN AN ORGANIZED HEALTH CARE EDUCATION/TRAINING PROGRAM

## 2024-08-14 PROCEDURE — 72200006 HC VAGINAL DELIVERY LEVEL III

## 2024-08-14 PROCEDURE — 63600175 PHARM REV CODE 636 W HCPCS

## 2024-08-14 PROCEDURE — 11000001 HC ACUTE MED/SURG PRIVATE ROOM

## 2024-08-14 PROCEDURE — 51702 INSERT TEMP BLADDER CATH: CPT

## 2024-08-14 PROCEDURE — 36415 COLL VENOUS BLD VENIPUNCTURE: CPT | Performed by: GENERAL PRACTICE

## 2024-08-14 PROCEDURE — 25000003 PHARM REV CODE 250

## 2024-08-14 PROCEDURE — 86780 TREPONEMA PALLIDUM: CPT | Performed by: GENERAL PRACTICE

## 2024-08-14 RX ORDER — ACETAMINOPHEN 325 MG/1
650 TABLET ORAL EVERY 6 HOURS PRN
Status: DISCONTINUED | OUTPATIENT
Start: 2024-08-14 | End: 2024-08-16 | Stop reason: HOSPADM

## 2024-08-14 RX ORDER — LIDOCAINE HYDROCHLORIDE AND EPINEPHRINE 15; 5 MG/ML; UG/ML
INJECTION, SOLUTION EPIDURAL
Status: DISCONTINUED | OUTPATIENT
Start: 2024-08-14 | End: 2024-08-14

## 2024-08-14 RX ORDER — HYDROCORTISONE 25 MG/G
CREAM TOPICAL 3 TIMES DAILY PRN
Status: DISCONTINUED | OUTPATIENT
Start: 2024-08-14 | End: 2024-08-16 | Stop reason: HOSPADM

## 2024-08-14 RX ORDER — KETOROLAC TROMETHAMINE 30 MG/ML
30 INJECTION, SOLUTION INTRAMUSCULAR; INTRAVENOUS EVERY 8 HOURS
Status: CANCELLED | OUTPATIENT
Start: 2024-08-14 | End: 2024-08-15

## 2024-08-14 RX ORDER — OXYTOCIN 10 [USP'U]/ML
10 INJECTION, SOLUTION INTRAMUSCULAR; INTRAVENOUS ONCE AS NEEDED
Status: DISCONTINUED | OUTPATIENT
Start: 2024-08-14 | End: 2024-08-16 | Stop reason: HOSPADM

## 2024-08-14 RX ORDER — IBUPROFEN 800 MG/1
800 TABLET ORAL EVERY 8 HOURS
Status: CANCELLED | OUTPATIENT
Start: 2024-08-15

## 2024-08-14 RX ORDER — OXYTOCIN-SODIUM CHLORIDE 0.9% IV SOLN 30 UNIT/500ML 30-0.9/5 UT/ML-%
95 SOLUTION INTRAVENOUS ONCE AS NEEDED
Status: DISCONTINUED | OUTPATIENT
Start: 2024-08-14 | End: 2024-08-16 | Stop reason: HOSPADM

## 2024-08-14 RX ORDER — DOCUSATE SODIUM 100 MG/1
200 CAPSULE, LIQUID FILLED ORAL 2 TIMES DAILY PRN
Status: DISCONTINUED | OUTPATIENT
Start: 2024-08-14 | End: 2024-08-16 | Stop reason: HOSPADM

## 2024-08-14 RX ORDER — OXYTOCIN-SODIUM CHLORIDE 0.9% IV SOLN 30 UNIT/500ML 30-0.9/5 UT/ML-%
95 SOLUTION INTRAVENOUS ONCE
Status: DISCONTINUED | OUTPATIENT
Start: 2024-08-14 | End: 2024-08-16 | Stop reason: HOSPADM

## 2024-08-14 RX ORDER — BUPIVACAINE HYDROCHLORIDE 2.5 MG/ML
INJECTION, SOLUTION INFILTRATION; PERINEURAL
Status: DISCONTINUED | OUTPATIENT
Start: 2024-08-14 | End: 2024-08-14

## 2024-08-14 RX ORDER — METHYLERGONOVINE MALEATE 0.2 MG/ML
200 INJECTION INTRAVENOUS ONCE AS NEEDED
Status: DISCONTINUED | OUTPATIENT
Start: 2024-08-14 | End: 2024-08-16 | Stop reason: HOSPADM

## 2024-08-14 RX ORDER — IBUPROFEN 600 MG/1
600 TABLET ORAL EVERY 6 HOURS PRN
Status: DISCONTINUED | OUTPATIENT
Start: 2024-08-14 | End: 2024-08-16 | Stop reason: HOSPADM

## 2024-08-14 RX ORDER — HYDROCODONE BITARTRATE AND ACETAMINOPHEN 10; 325 MG/1; MG/1
1 TABLET ORAL EVERY 4 HOURS PRN
Status: DISCONTINUED | OUTPATIENT
Start: 2024-08-14 | End: 2024-08-16 | Stop reason: HOSPADM

## 2024-08-14 RX ORDER — PRENATAL WITH FERROUS FUM AND FOLIC ACID 3080; 920; 120; 400; 22; 1.84; 3; 20; 10; 1; 12; 200; 27; 25; 2 [IU]/1; [IU]/1; MG/1; [IU]/1; MG/1; MG/1; MG/1; MG/1; MG/1; MG/1; UG/1; MG/1; MG/1; MG/1; MG/1
1 TABLET ORAL DAILY
Status: DISCONTINUED | OUTPATIENT
Start: 2024-08-14 | End: 2024-08-16 | Stop reason: HOSPADM

## 2024-08-14 RX ORDER — CARBOPROST TROMETHAMINE 250 UG/ML
250 INJECTION, SOLUTION INTRAMUSCULAR
Status: DISCONTINUED | OUTPATIENT
Start: 2024-08-14 | End: 2024-08-16 | Stop reason: HOSPADM

## 2024-08-14 RX ORDER — ONDANSETRON 4 MG/1
8 TABLET, ORALLY DISINTEGRATING ORAL EVERY 8 HOURS PRN
Status: DISCONTINUED | OUTPATIENT
Start: 2024-08-14 | End: 2024-08-16 | Stop reason: HOSPADM

## 2024-08-14 RX ORDER — DIPHENOXYLATE HYDROCHLORIDE AND ATROPINE SULFATE 2.5; .025 MG/1; MG/1
2 TABLET ORAL EVERY 6 HOURS PRN
Status: DISCONTINUED | OUTPATIENT
Start: 2024-08-14 | End: 2024-08-16 | Stop reason: HOSPADM

## 2024-08-14 RX ORDER — DIPHENHYDRAMINE HYDROCHLORIDE 50 MG/ML
25 INJECTION INTRAMUSCULAR; INTRAVENOUS EVERY 4 HOURS PRN
Status: DISCONTINUED | OUTPATIENT
Start: 2024-08-14 | End: 2024-08-16 | Stop reason: HOSPADM

## 2024-08-14 RX ORDER — FENTANYL/BUPIVACAINE/NS/PF 2-1250MCG
PLASTIC BAG, INJECTION (ML) INJECTION CONTINUOUS PRN
Status: DISCONTINUED | OUTPATIENT
Start: 2024-08-14 | End: 2024-08-14

## 2024-08-14 RX ORDER — MISOPROSTOL 100 UG/1
800 TABLET ORAL ONCE AS NEEDED
Status: DISCONTINUED | OUTPATIENT
Start: 2024-08-14 | End: 2024-08-16 | Stop reason: HOSPADM

## 2024-08-14 RX ORDER — OXYTOCIN-SODIUM CHLORIDE 0.9% IV SOLN 30 UNIT/500ML 30-0.9/5 UT/ML-%
10 SOLUTION INTRAVENOUS ONCE AS NEEDED
Status: DISCONTINUED | OUTPATIENT
Start: 2024-08-14 | End: 2024-08-16 | Stop reason: HOSPADM

## 2024-08-14 RX ORDER — SIMETHICONE 80 MG
1 TABLET,CHEWABLE ORAL EVERY 6 HOURS PRN
Status: DISCONTINUED | OUTPATIENT
Start: 2024-08-14 | End: 2024-08-16 | Stop reason: HOSPADM

## 2024-08-14 RX ORDER — HYDROCODONE BITARTRATE AND ACETAMINOPHEN 5; 325 MG/1; MG/1
1 TABLET ORAL EVERY 4 HOURS PRN
Status: DISCONTINUED | OUTPATIENT
Start: 2024-08-14 | End: 2024-08-16 | Stop reason: HOSPADM

## 2024-08-14 RX ORDER — DIPHENHYDRAMINE HCL 25 MG
25 CAPSULE ORAL EVERY 4 HOURS PRN
Status: DISCONTINUED | OUTPATIENT
Start: 2024-08-14 | End: 2024-08-16 | Stop reason: HOSPADM

## 2024-08-14 RX ADMIN — Medication 12 ML/HR: at 07:08

## 2024-08-14 RX ADMIN — BUPIVACAINE HYDROCHLORIDE 4 ML: 2.5 INJECTION, SOLUTION INFILTRATION; PERINEURAL at 12:08

## 2024-08-14 RX ADMIN — LIDOCAINE HYDROCHLORIDE,EPINEPHRINE BITARTRATE 3 ML: 15; .005 INJECTION, SOLUTION EPIDURAL; INFILTRATION; INTRACAUDAL; PERINEURAL at 12:08

## 2024-08-14 RX ADMIN — Medication 95 MILLI-UNITS/MIN: at 12:08

## 2024-08-14 RX ADMIN — OXYTOCIN-SODIUM CHLORIDE 0.9% IV SOLN 30 UNIT/500ML 10 UNITS: 30-0.9/5 SOLUTION at 11:08

## 2024-08-14 RX ADMIN — IBUPROFEN 600 MG: 600 TABLET, FILM COATED ORAL at 01:08

## 2024-08-14 RX ADMIN — PRENATAL VITAMINS-IRON FUMARATE 27 MG IRON-FOLIC ACID 0.8 MG TABLET 1 TABLET: at 01:08

## 2024-08-14 RX ADMIN — Medication: at 01:08

## 2024-08-14 RX ADMIN — DIPHENHYDRAMINE HYDROCHLORIDE 12.5 MG: 50 INJECTION INTRAMUSCULAR; INTRAVENOUS at 03:08

## 2024-08-14 RX ADMIN — BUPIVACAINE HYDROCHLORIDE 10 ML: 2.5 INJECTION, SOLUTION INFILTRATION; PERINEURAL at 10:08

## 2024-08-14 RX ADMIN — BUPIVACAINE HYDROCHLORIDE 10 ML: 2.5 INJECTION, SOLUTION INFILTRATION; PERINEURAL at 04:08

## 2024-08-14 RX ADMIN — Medication 12 ML/HR: at 12:08

## 2024-08-14 RX ADMIN — IBUPROFEN 600 MG: 600 TABLET, FILM COATED ORAL at 07:08

## 2024-08-14 NOTE — H&P
HISTORY AND PHYSICAL                                                OBSTETRICS        Chief Complaint:  Labor contractions     Subjective:      Andreina Dotson is a 23 y.o.  female with IUP at 37w4d gestation who presents to L&D for active labor.    Patient reports regular contraction increasing in frequency and intensity.  She reports normal fetal movement, and denies vaginal bleeding or loss of fluid.  Her pregnancy has been uncomplicated thus far.  Please see antepartum records for more details.  Care this pregnancy has been with Huber Trejo MD     PMHx:   Past Medical History:   Diagnosis Date    Known health problems: none     Pregnancy 2023       PSHx:   Past Surgical History:   Procedure Laterality Date    CHOLECYSTECTOMY N/A 2016    EPIDURAL STEROID INJECTION INTO LUMBAR SPINE      WISDOM TOOTH EXTRACTION  2016       All:   Review of patient's allergies indicates:   Allergen Reactions    Shellfish containing products Itching    Sulfa (sulfonamide antibiotics) Hives       Meds:   Medications Prior to Admission   Medication Sig Dispense Refill Last Dose    butalbital-acetaminophen-caffeine -40 mg (FIORICET, ESGIC) -40 mg per tablet Take 1 tablet by mouth every 4 (four) hours as needed for Headaches. 30 tablet 2 2024    metoclopramide HCl (REGLAN) 10 MG tablet Take 1 tablet (10 mg total) by mouth 3 (three) times daily with meals. 90 tablet 1 Past Week    PNV no.95/ferrous fum/folic ac (PRENATAL ORAL) Take by mouth.   2024    azelastine (ASTELIN) 137 mcg (0.1 %) nasal spray  (Patient not taking: Reported on 6/10/2024)       linaCLOtide (LINZESS) 72 mcg Cap capsule Take 1 capsule (72 mcg total) by mouth before breakfast. (Patient not taking: Reported on 6/10/2024) 30 capsule 5     traZODone (DESYREL) 50 MG tablet Take 1 tablet (50 mg total) by mouth every evening. 1/2-1 tab at bedtime 30 tablet 5        SH:   Social History     Socioeconomic History    Marital status:       Spouse name: Beto    Number of children: 0   Occupational History    Occupation: Billing     Comment: [employer]   Tobacco Use    Smoking status: Never    Smokeless tobacco: Never   Substance and Sexual Activity    Alcohol use: Not Currently    Drug use: Never    Sexual activity: Yes     Partners: Male       FH:   Family History   Problem Relation Name Age of Onset    No Known Problems Mother      No Known Problems Father      Cancer Maternal Grandfather Christophe Can     Stroke Maternal Grandmother Dulce Can     Heart failure Paternal Grandmother Jenn Dotson        OBHx:   OB History    Para Term  AB Living   1 1 1 0 0 1   SAB IAB Ectopic Multiple Live Births   0 0 0 0 1      # Outcome Date GA Lbr Michael/2nd Weight Sex Type Anes PTL Lv   1 Term 24 37w4d 14:50 / 00:38 3.657 kg (8 lb 1 oz) F Vag-Spont EPI N HENRRY      Name: Girl Andreina Dotson      Apgar1: 3  Apgar5: 9       Objective:      [unfilled]  [unfilled]  BMI Readings from Last 1 Encounters:   24 25.69 kg/m²         General:   alert and cooperative   HEENT:  normocephalic, atraumatic   Lungs:   Normal work of breathing   Heart:   Normal cap refill   Abdomen:  gravid, non-tender   Extremities non-tender, no edema   Derm: no rashes or lesions   Psych: appropriate mood and affect   Pelvis:  adequate       Cervix:     Dilation: 5 cm    Effacement: 75%    Station:  -3               Vertex by palpation on exam    Lab Review  Prenatal Labs:  Lab Results   Component Value Date    GROUPTRH O POS 2024    INDCOGEL NEG 2024    HGB 9.4 (L) 2024    HCT 28.8 (L) 2024     2024    HEPBSAG Nonreactive 2024    PRPQ 1:1 (A) 2024    LABURIN No Growth 2024    STREPBCULT Negative 2024    [id number] Negative 2021        Assessment:     23 y.o.  at 37w4d gestation here in active labor.      Plan:     1. Risks, benefits, alternatives and possible complications of delivery,  possible , possible blood transfusion, possible operative vaginal delivery have been discussed in detail with the patient. All questions have been answered, and Ms. Dotson has voiced understanding and agrees to the treatment plan.  2. Consents signed and in chart  3. Admit to Labor and Delivery unit

## 2024-08-14 NOTE — L&D DELIVERY NOTE
[facility] - Labor and Delivery  OBGYN  Operative Note    SUMMARY     Date of Procedure: 2024    Procedure: Spontaneous Vaginal Delivery    Surgeon: Huber Trejo MD    Post-Operative Diagnosis:   1. s/p  37w4d without complications  2. no perineal laceration without repair      Anesthesia: epidural    Procedure in Detail: With good maternal effort a viable liveborn infant was delivered after approximately 15 minutes of pushing. The fetal head delivered. Nuchal cord was not present. Remainder of infant delivered without difficulty. The placenta then delivered spontaneously, and was noted to be intact. The vagina, perineum, and cervix were examined. . After any necessary repairs were performed, all instruments and sponges were removed from the vagina. Sponge, lap, and needle counts were correct after the procedure.    Repair Suture: None in standard fashion    Infant: Liveborn female infant 3 vessel umbilical cord.  Apgars    Living status:   Apgar Component Scores:  1 min.:  5 min.:  10 min.:  15 min.:  20 min.:    Skin color:         Heart rate:         Reflex irritability:         Muscle tone:         Respiratory effort:         Total:                  Complications: none    Estimated Blood Loss (EBL): 200 cc           Condition: Mother and baby doing well

## 2024-08-14 NOTE — PLAN OF CARE
Problem:  Fall Injury Risk  Goal: Absence of Fall, Infant Drop and Related Injury  Outcome: Progressing     Problem: Infection  Goal: Absence of Infection Signs and Symptoms  Outcome: Progressing     Problem: Adult Inpatient Plan of Care  Goal: Plan of Care Review  Outcome: Progressing  Goal: Patient-Specific Goal (Individualized)  Outcome: Progressing  Goal: Absence of Hospital-Acquired Illness or Injury  Outcome: Progressing  Goal: Optimal Comfort and Wellbeing  Outcome: Progressing  Goal: Readiness for Transition of Care  Outcome: Progressing     Problem: Postpartum (Vaginal Delivery)  Goal: Successful Parent Role Transition  Outcome: Progressing  Goal: Hemostasis  Outcome: Progressing  Goal: Absence of Infection Signs and Symptoms  Outcome: Progressing  Goal: Anesthesia/Sedation Recovery  Outcome: Progressing  Goal: Optimal Pain Control and Function  Outcome: Progressing  Goal: Effective Urinary Elimination  Outcome: Progressing

## 2024-08-14 NOTE — ANESTHESIA POSTPROCEDURE EVALUATION
Anesthesia Post Evaluation    Patient: Andreina Dotson    Procedure(s) Performed: * No procedures listed *    Final Anesthesia Type: epidural      Patient location during evaluation: labor & delivery  Patient participation: Yes- Able to Participate  Level of consciousness: awake and alert and oriented  Post-procedure vital signs: reviewed and stable  Pain management: adequate  Airway patency: patent  CHANNING mitigation strategies: Multimodal analgesia  PONV status at discharge: No PONV  Anesthetic complications: no      Cardiovascular status: blood pressure returned to baseline and hemodynamically stable  Respiratory status: unassisted and spontaneous ventilation  Hydration status: euvolemic  Follow-up not needed.          Vitals Value Taken Time   /82 08/14/24 1428   Temp 36.9 °C (98.5 °F) 08/14/24 1428   Pulse 96 08/14/24 1428   Resp 18 08/14/24 1428   SpO2 100 % 08/14/24 1428         No case tracking events are documented in the log.      Pain/Felton Score: Pain Rating Prior to Med Admin: 5 (8/14/2024  1:25 PM)

## 2024-08-14 NOTE — NURSING
Pt assisted up to the bathroom after delivery.  Unable to void, jose c care performed and pads applied, unsteady gait noted, assistance needed. MD called to bedside for noted bleeding.

## 2024-08-14 NOTE — ANESTHESIA PREPROCEDURE EVALUATION
[facility] - Labor and Delivery  Anesthesiology  Labor Epidural    Patient Name: Andreina Dotson  MRN: [id number]  Admission Date: 2024  Primary Care Provider: Ryne Way Jr., MD  Attending Physician: Huber Trejo MD    Subjective:     Patient in labor, requesting epidural.    OB History    Para Term  AB Living   1 0 0 0 0 0   SAB IAB Ectopic Multiple Live Births   0 0 0 0 0      # Outcome Date GA Lbr Michael/2nd Weight Sex Type Anes PTL Lv   1 Current              Past Medical History:   Diagnosis Date    Known health problems: none     Pregnancy 2023     Past Surgical History:   Procedure Laterality Date    CHOLECYSTECTOMY N/A     EPIDURAL STEROID INJECTION INTO LUMBAR SPINE      WISDOM TOOTH EXTRACTION         PTA Medications   Medication Sig    butalbital-acetaminophen-caffeine -40 mg (FIORICET, ESGIC) -40 mg per tablet Take 1 tablet by mouth every 4 (four) hours as needed for Headaches.    metoclopramide HCl (REGLAN) 10 MG tablet Take 1 tablet (10 mg total) by mouth 3 (three) times daily with meals.    PNV no.95/ferrous fum/folic ac (PRENATAL ORAL) Take by mouth.    azelastine (ASTELIN) 137 mcg (0.1 %) nasal spray  (Patient not taking: Reported on 6/10/2024)    linaCLOtide (LINZESS) 72 mcg Cap capsule Take 1 capsule (72 mcg total) by mouth before breakfast. (Patient not taking: Reported on 6/10/2024)    traZODone (DESYREL) 50 MG tablet Take 1 tablet (50 mg total) by mouth every evening. 1/2-1 tab at bedtime       Review of patient's allergies indicates:   Allergen Reactions    Shellfish containing products Itching    Sulfa (sulfonamide antibiotics) Hives        Tobacco Use    Smoking status: Never    Smokeless tobacco: Never   Substance and Sexual Activity    Alcohol use: Not Currently    Drug use: Never    Sexual activity: Yes     Partners: Male     Review of Systems   Objective:     Vital Signs (Most Recent):  Temp: 36.8 °C (98.3 °F) (24  8)  Pulse: 68 (24)  Resp: 18 (24)  BP: 132/72 (24)  SpO2: 99 % (24) Vital Signs (24h Range):  Temp:  [36.8 °C (98.3 °F)-36.9 °C (98.5 °F)] 36.8 °C (98.3 °F)  Pulse:  [68-98] 68  Resp:  [18] 18  SpO2:  [98 %-100 %] 99 %  BP: (125-141)/(72-95) 132/72     Weight: 65.8 kg (145 lb)  Body mass index is 25.69 kg/m².    Significant Labs:  Lab Results   Component Value Date    WBC 11.71 (H) 2024    HGB 9.4 (L) 2024    HCT 28.8 (L) 2024    MCV 86.2 2024     2024       Assessment/Plan:     23 y.o. female  at 37w3d for: Labor epidural    Frank Alexander, GWENDOLYN  Anesthesiology  [facility] - Labor and Delivery         Pre-op Assessment    I have reviewed the Patient Summary Reports.     I have reviewed the Nursing Notes. I have reviewed the NPO Status.   I have reviewed the Medications.     Review of Systems  Anesthesia Hx:             Denies Family Hx of Anesthesia complications.    Denies Personal Hx of Anesthesia complications.                    Hematology/Oncology:  Hematology Normal   Oncology Normal                                   EENT/Dental:  EENT/Dental Normal           Cardiovascular:  Cardiovascular Normal                                            Pulmonary:  Pulmonary Normal                       Renal/:  Renal/ Normal                 Hepatic/GI:  Hepatic/GI Normal                 Musculoskeletal:  Musculoskeletal Normal                Neurological:  Neurology Normal                                      Endocrine:  Endocrine Normal            Dermatological:  Skin Normal    Psych:  Psychiatric Normal                    Physical Exam  General: Well nourished, Cooperative, Oriented and Alert    Airway:  Mallampati: II   Mouth Opening: Normal  TM Distance: Normal  Tongue: Normal  Neck ROM: Normal ROM        Anesthesia Plan  Type of Anesthesia, risks & benefits discussed:    Anesthesia Type: Epidural  Informed  Consent: Informed consent signed with the Patient and all parties understand the risks and agree with anesthesia plan.  All questions answered.   ASA Score: 2  Day of Surgery Review of History & Physical: H&P Update referred to the surgeon/provider.    Ready For Surgery From Anesthesia Perspective.     .

## 2024-08-14 NOTE — ANESTHESIA PROCEDURE NOTES
Epidural    Patient location during procedure: OB   Reason for block: primary anesthetic   Reason for block: labor analgesia requested by patient and obstetrician  Diagnosis: Labor pain relief   Start time: 8/14/2024 12:08 AM  Timeout: 8/14/2024 12:05 AM  End time: 8/14/2024 12:13 AM    Staffing  Performing Provider: Frank Alexander CRNA  Authorizing Provider: Frank Alexander CRNA    Staffing  Performed by: Frank Alexander CRNA  Authorized by: Frank Alexander CRNA        Preanesthetic Checklist  Completed: patient identified, IV checked, site marked, risks and benefits discussed, surgical consent, monitors and equipment checked, pre-op evaluation, timeout performed, anesthesia consent given, hand hygiene performed and patient being monitored  Preparation  Patient position: sitting (Mask and sterile gloves worn)  Prep: ChloraPrep and Pt preloaded with 500 to 1000ml of crystalloid  Patient monitoring: Pulse Ox (Pre & Post procedure vitals & FHR are recorded by the nurse q5mins as appropriate)  Reason for block: primary anesthetic   Epidural  Skin Anesthetic: lidocaine 1% (25G 1.5inch needle)  Skin Wheal: 3 mL  Administration type: continuous  Approach: midline  Interspace: L3-4    Injection technique: BRANDON saline  Needle and Epidural Catheter  Needle type: Tuohy   Needle gauge: 17  Needle length: 3.5 inches  Needle insertion depth: 5 cm  Catheter type: springwound and multi-orifice  Catheter size: 19 G  Catheter at skin depth: 10 cm  Insertion Attempts: 1  Test dose: 3 mL of lidocaine 1.5% with Epi 1-to-200,000 (Test dose given via epidural catheter)  Additional Documentation: negative aspiration for heme and CSF, no paresthesia on injection, no significant pain on injection, incremental injection, no signs/symptoms of IV or SA injection and no significant complaints from patient (Epidural catheter connected to epidural pump with filter in situ and pt instructed on its use.  Warning label placed on epidural  catheter.)  Needle localization: anatomical landmarks  Assessment  Ease of block: easy  Patient's tolerance of the procedure: comfortable throughout block (Pt returned to supine position with head of bed elevated 30 degrees and KELIN applied as needed)  Additional Notes  Pt instructed in use of epidural PCA  Improvement in pain relief documented by L&D nurses  L&D nurses instructed to call if no relief at all after 30 mins No inadvertent dural puncture with Tuohy.  Dural puncture not performed with spinal needle

## 2024-08-15 PROCEDURE — 11000001 HC ACUTE MED/SURG PRIVATE ROOM

## 2024-08-15 PROCEDURE — 25000003 PHARM REV CODE 250: Performed by: STUDENT IN AN ORGANIZED HEALTH CARE EDUCATION/TRAINING PROGRAM

## 2024-08-15 PROCEDURE — 25000003 PHARM REV CODE 250: Performed by: OBSTETRICS & GYNECOLOGY

## 2024-08-15 PROCEDURE — 99222 1ST HOSP IP/OBS MODERATE 55: CPT | Mod: ,,, | Performed by: GENERAL PRACTICE

## 2024-08-15 RX ADMIN — PRENATAL VITAMINS-IRON FUMARATE 27 MG IRON-FOLIC ACID 0.8 MG TABLET 1 TABLET: at 08:08

## 2024-08-15 RX ADMIN — IBUPROFEN 600 MG: 600 TABLET, FILM COATED ORAL at 08:08

## 2024-08-15 RX ADMIN — IBUPROFEN 600 MG: 600 TABLET, FILM COATED ORAL at 07:08

## 2024-08-15 RX ADMIN — IBUPROFEN 600 MG: 600 TABLET, FILM COATED ORAL at 02:08

## 2024-08-15 RX ADMIN — Medication: at 08:08

## 2024-08-15 RX ADMIN — ACETAMINOPHEN 325MG 650 MG: 325 TABLET ORAL at 07:08

## 2024-08-15 NOTE — LACTATION NOTE
This note was copied from a baby's chart.  Baby has been having trouble latching. Mom is supplementing with formula and would like to pump to simulate milk production.     Mom was set up with pump. Assisted with use of pump, mom collected 13mls. Reviewed cleaning kit after each use, milk storage and handling, as well as average feeding volumes. Explained supply/demand of milk production. Encouraged pumping every 2-3 hours in day and every 3-4 hours at night. Answered mom and dads questions. Offered further assistance if needed. Verbalized understanding of all.

## 2024-08-15 NOTE — PLAN OF CARE
Problem: Breastfeeding  Goal: Effective Breastfeeding  Intervention: Promote Effective Breastfeeding  Flowsheets (Taken 8/15/2024 0230)  Breastfeeding Assistance:   assisted with positioning   feeding cue recognition promoted   feeding session observed   infant latch-on verified   support offered   infant stimulated to wakeful state   infant suck/swallow verified  Parent-Child Attachment Promotion:   cue recognition promoted   face-to-face positioning promoted   parent/caregiver presence encouraged   strengths emphasized   positive reinforcement provided   skin-to-skin contact encouraged  Intervention: Support Exclusive Breastfeeding Success  Flowsheets (Taken 8/15/2024 0230)  Supportive Measures:   active listening utilized   counseling provided   positive reinforcement provided   verbalization of feelings encouraged   problem-solving facilitated  Breastfeeding Support:   lactation counseling provided   encouragement provided

## 2024-08-15 NOTE — CONSULTS
Infectious Disease  Consult Note    Patient Name: Andreina Dotson   MRN: [id number]   Admission Date: 8/13/2024   Hospital Length of Stay: 2 days  Attending Physician: Huber Trejo MD   Primary Care Provider: Ryne Way Jr., MD     Isolation Status: No active isolations       Assessment/Plan:       23 year old female patient with no significant past medical history presented to [facility] and  for active labor at 37 weeks gestation.  Syphilis test was ordered, this was positive, with a RPR 1:1, ID consulted for assistance in management    Positive Syphilis test    -initial Syphilis Ab positive, RPR 1:1  -Repeat Syphilis Ab is negative  -Patient with no significant risk factors, unclear if tested before  -The fact that Ab tests was negative on repeat makes an initial false positive test most likely    Recommendations:  -TPPA pending   -Pediatrician informed of mom's positive test and negative repeat  -No additional steps at this time, will follow up on TPPA and determine need for therapy or further testing accordingly  -Discussed with patient and partner at bedside     Thank you for your consult. ID will sign off. Please contact us with any questions or concerns.    Antibiotics History:  None    Portions of this note dictated using EMR integrated voice recognition software, and may be subject to voice recognition errors not corrected at proofreading. Please contact writer for clarification if needed.    Subjective:     Principal Problem: <principal problem not specified>     HPI:   23 year old female patient with no significant past medical history presented to [facility] and  for active labor at 37 weeks gestation.  Syphilis test was ordered, this was positive, with a RPR 1:1, ID consulted for assistance in management    Past Medical History:   Diagnosis Date    Known health problems: none     Pregnancy 11/2023        Past Surgical History:   Procedure Laterality Date    CHOLECYSTECTOMY N/A 2016    EPIDURAL STEROID INJECTION  INTO LUMBAR SPINE      WISDOM TOOTH EXTRACTION  2016       Review of patient's allergies indicates:   Allergen Reactions    Shellfish containing products Itching    Sulfa (sulfonamide antibiotics) Hives        Family History       Problem Relation (Age of Onset)    Cancer Maternal Grandfather    Heart failure Paternal Grandmother    No Known Problems Mother, Father    Stroke Maternal Grandmother             Social History     Socioeconomic History    Marital status:      Spouse name: Beto    Number of children: 0   Occupational History    Occupation: Billing     Comment: LOS   Tobacco Use    Smoking status: Never    Smokeless tobacco: Never   Substance and Sexual Activity    Alcohol use: Not Currently    Drug use: Never    Sexual activity: Yes     Partners: Male        Lines/Drains/Airways       None                    Medication:  Medications Prior to Admission   Medication Sig    butalbital-acetaminophen-caffeine -40 mg (FIORICET, ESGIC) -40 mg per tablet Take 1 tablet by mouth every 4 (four) hours as needed for Headaches.    metoclopramide HCl (REGLAN) 10 MG tablet Take 1 tablet (10 mg total) by mouth 3 (three) times daily with meals.    PNV no.95/ferrous fum/folic ac (PRENATAL ORAL) Take by mouth.    azelastine (ASTELIN) 137 mcg (0.1 %) nasal spray  (Patient not taking: Reported on 6/10/2024)    linaCLOtide (LINZESS) 72 mcg Cap capsule Take 1 capsule (72 mcg total) by mouth before breakfast. (Patient not taking: Reported on 6/10/2024)    traZODone (DESYREL) 50 MG tablet Take 1 tablet (50 mg total) by mouth every evening. 1/2-1 tab at bedtime          Antimicrobials:  Antibiotics (From admission, onward)      None             Antifungals (From admission, onward)      None            Antivirals (From admission, onward)      None               Review of Systems   Review of Systems   All other systems reviewed and are negative.        Objective:     Vital Signs (Most Recent):  Temp: 98.4 °F (36.9  °C) (08/15/24 2025)  Pulse: 67 (08/15/24 2025)  Resp: 18 (08/14/24 1428)  BP: 124/79 (08/15/24 2025)  SpO2: 98 % (08/15/24 2025)  Vital Signs (24h Range):  Temp:  [98.3 °F (36.8 °C)-98.4 °F (36.9 °C)] 98.4 °F (36.9 °C)  Pulse:  [67-91] 67  SpO2:  [97 %-99 %] 98 %  BP: (118-127)/(74-85) 124/79      Weight:   Wt Readings from Last 1 Encounters:   08/13/24 65.8 kg (145 lb)      Body mass index is Body mass index is 25.69 kg/m².     Estimated Creatinine Clearance: CrCl cannot be calculated (Patient's most recent lab result is older than the maximum 7 days allowed.).     Physical Exam  Physical Exam  Constitutional:       Appearance: Normal appearance.   HENT:      Head: Normocephalic and atraumatic.      Mouth/Throat:      Pharynx: No oropharyngeal exudate or posterior oropharyngeal erythema.   Eyes:      Extraocular Movements: Extraocular movements intact.      Pupils: Pupils are equal, round, and reactive to light.   Cardiovascular:      Rate and Rhythm: Normal rate and regular rhythm.      Heart sounds: No murmur heard.  Pulmonary:      Effort: No respiratory distress.      Breath sounds: No wheezing, rhonchi or rales.   Abdominal:      General: Bowel sounds are normal. There is no distension.      Palpations: Abdomen is soft.      Tenderness: There is no abdominal tenderness. There is no right CVA tenderness or left CVA tenderness.   Musculoskeletal:         General: No swelling or tenderness.      Cervical back: Neck supple. No rigidity or tenderness.   Lymphadenopathy:      Cervical: No cervical adenopathy.   Skin:     Findings: No lesion or rash.   Neurological:      General: No focal deficit present.      Mental Status: She is alert and oriented to person, place, and time. Mental status is at baseline.      Cranial Nerves: No cranial nerve deficit.      Motor: No weakness.   Psychiatric:         Mood and Affect: Mood normal.         Behavior: Behavior normal.           Significant Labs: CBC:   Recent Labs   Lab  "08/13/24  2214 08/14/24  1722   WBC 11.71*  --    HGB 9.4* 8.8*   HCT 28.8* 26.4*     --      CMP: No results for input(s): "NA", "K", "CL", "CO2", "GLU", "BUN", "CREATININE", "CALCIUM", "PROT", "ALBUMIN", "BILITOT", "ALKPHOS", "AST", "ALT", "ANIONGAP", "EGFRNONAA" in the last 48 hours.    Invalid input(s): "ESTGFAFRICA"      Microbiology Results (last 7 days)       ** No results found for the last 168 hours. **             Significant Imaging: I have reviewed all pertinent imaging results/findings within the past 24 hours.      Gil Rowan MD  Infectious Disease  [facility]      "

## 2024-08-15 NOTE — PROGRESS NOTES
PostPartum Progress Note        Subjective:      Postpartum Day #1 after   .  Patient is without complaints. Lochia decreasing, less than menses.  Pain is well controlled. Patient is ambulating without lightheadedness. Tolerating regular diet.    Objective:      Temp:  [98.3 °F (36.8 °C)-100 °F (37.8 °C)] 98.3 °F (36.8 °C)  Pulse:  [] 73  Resp:  [18] 18  SpO2:  [97 %-100 %] 98 %  BP: (118-160)/(73-98) 118/76    Intake/Output Summary (Last 24 hours) at 8/15/2024 1008  Last data filed at 2024 1355  Gross per 24 hour   Intake --   Output 1600 ml   Net -1600 ml     Body mass index is 25.69 kg/m².    General: no acute distress  Abdomen: soft, appropriately tender  Extremities: non-tender, symmetric    Group & Rh   Date Value Ref Range Status   2024 O POS  Final     Recent Results (from the past 336 hour(s))   CBC with Differential    Collection Time: 24 10:14 PM   Result Value Ref Range    WBC 11.71 (H) 4.50 - 11.50 x10(3)/mcL    Hgb 9.4 (L) 12.0 - 16.0 g/dL    Hct 28.8 (L) 37.0 - 47.0 %    Platelet 366 130 - 400 x10(3)/mcL          Assessment/Plan     23 y.o.  S/P , PPD # 1 - Doing Well   -Continue routine postpartum care  -Anticipated d/c PPD#2    There are no problems to display for this patient.          Francois Ta MD  08/15/2024 10:08 AM     No

## 2024-08-16 ENCOUNTER — LACTATION ENCOUNTER (OUTPATIENT)
Dept: OBSTETRICS AND GYNECOLOGY | Facility: HOSPITAL | Age: 24
End: 2024-08-16

## 2024-08-16 VITALS
DIASTOLIC BLOOD PRESSURE: 77 MMHG | OXYGEN SATURATION: 98 % | TEMPERATURE: 98 F | HEART RATE: 71 BPM | SYSTOLIC BLOOD PRESSURE: 126 MMHG | WEIGHT: 145 LBS | RESPIRATION RATE: 16 BRPM | HEIGHT: 63 IN | BODY MASS INDEX: 25.69 KG/M2

## 2024-08-16 PROBLEM — Z37.9 NORMAL LABOR: Status: ACTIVE | Noted: 2024-08-16

## 2024-08-16 LAB — T PALLIDUM AB SER QL AGGL: NEGATIVE

## 2024-08-16 PROCEDURE — 25000003 PHARM REV CODE 250: Performed by: STUDENT IN AN ORGANIZED HEALTH CARE EDUCATION/TRAINING PROGRAM

## 2024-08-16 PROCEDURE — 25000003 PHARM REV CODE 250: Performed by: OBSTETRICS & GYNECOLOGY

## 2024-08-16 RX ADMIN — IBUPROFEN 600 MG: 600 TABLET, FILM COATED ORAL at 01:08

## 2024-08-16 RX ADMIN — IBUPROFEN 600 MG: 600 TABLET, FILM COATED ORAL at 08:08

## 2024-08-16 RX ADMIN — PRENATAL VITAMINS-IRON FUMARATE 27 MG IRON-FOLIC ACID 0.8 MG TABLET 1 TABLET: at 08:08

## 2024-08-16 RX ADMIN — ACETAMINOPHEN 325MG 650 MG: 325 TABLET ORAL at 08:08

## 2024-08-16 RX ADMIN — ACETAMINOPHEN 325MG 650 MG: 325 TABLET ORAL at 01:08

## 2024-08-16 NOTE — LACTATION NOTE
This note was copied from a baby's chart.  Mom says she has not been doing much pumping but does plan to do more as they go home. Explained that milk will likely be increased tomorrow or next day. Explained the role of frequent pumping in keeping mom's breasts comfortable and building milk supply. Mom does have a pump for home use.     Reviewed milk storage and handling guidelines as well as average feeding volumes. Reminded mom to check safety of  medications. Tips on prevention and management of engorgement reviewed. Answered moms questions. Verbalized understanding of all.

## 2024-08-16 NOTE — PROGRESS NOTES
Patient informed Dr Small has increased the gabapentin to 300mg TID. Patient verbalized understanding of info provided. She will call prn with any concerns related to dosage increase.   Pt seen and examined.  Doing well.  Ambulating and voiding.  Tolerating regular diet.  Pain controlled with meds. Baby doing well in room.

## 2024-08-16 NOTE — DISCHARGE SUMMARY
"[facility] - 2nd Floor Mother/Baby Unit  Obstetrics  Discharge Summary      Patient Name: Andreina Dotson  MRN: [id number]  Admission Date: 2024  Hospital Length of Stay: 3 days  Discharge Date and Time:  2024 10:37 AM  Attending Physician: Amanda Martino MD   Discharging Provider: Pa Velez MD  Primary Care Provider: Ryne Way Jr., MD    HPI:  pp2    * No surgery found *     Hospital Course: stable    Consults (From admission, onward)          Status Ordering Provider     Inpatient consult to Infectious Diseases  Once        Provider:  Gil Rowan MD    Completed AMANDA MARTINO     Inpatient consult to Anesthesiology  Once        Provider:  (Not yet assigned)    SHAHRIAR Mitchell            Final Active Diagnoses:    Diagnosis Date Noted POA    PRINCIPAL PROBLEM:  Normal labor [O80, Z37.9] 2024 Not Applicable      Problems Resolved During this Admission:        Labs: All labs within the past 24 hours have been reviewed    Feeding Method: bottle    Immunizations       None            Delivery:    Episiotomy: None   Lacerations: None   Repair suture: None   Repair # of packets:     Blood loss (ml):       Birth information:  YOB: 2024   Time of birth: 11:28 AM   Sex: female   Delivery type: Vaginal, Spontaneous   Gestational Age: 37w4d     Measurements    Weight: 3657 g  Weight (lbs): 8 lb 1 oz  Length: 49.5 cm  Length (in): 19.5"  Head circumference: 33 cm         Delivery Clinician: Delivery Providers    Delivering clinician: Amanda Martino MD   Provider Role    Bettye Carrillo, LONNIE Nurse    Hafsa Tilley RN Delivery Nurse    Rachel Can RN Nurse             Additional  information:  Forceps:    Vacuum:    Breech:    Observed anomalies      Living?:     Apgars    Living status: Living  Apgar Component Scores:  1 min.:  5 min.:  10 min.:  15 min.:  20 min.:    Skin color:  0  1  1      Heart rate:  2  2  2      Reflex irritability:  0  2  " 2      Muscle tone:  0  2  2      Respiratory effort:  1  2  2      Total:  3  9  9      Apgars assigned by: CHALO MCCRARY         Placenta: Delivered:       appearance  Pending Diagnostic Studies:       Procedure Component Value Units Date/Time    Syphilis Ab, CATHY [[id number]] Collected: 08/14/24 1527    Order Status: Sent Lab Status: In process Updated: 08/14/24 1534    Specimen: Blood             Discharged Condition: stable    Disposition: Home or Self Care    Follow Up:   Follow-up Information       Huber Trejo MD. Go in 6 week(s).    Specialty: Obstetrics and Gynecology  Contact information:  [address] [place] [id number]  [phone]                           Patient Instructions:   No discharge procedures on file.  Medications:  Current Discharge Medication List        CONTINUE these medications which have NOT CHANGED    Details   butalbital-acetaminophen-caffeine -40 mg (FIORICET, ESGIC) -40 mg per tablet Take 1 tablet by mouth every 4 (four) hours as needed for Headaches.  Qty: 30 tablet, Refills: 2      metoclopramide HCl (REGLAN) 10 MG tablet Take 1 tablet (10 mg total) by mouth 3 (three) times daily with meals.  Qty: 90 tablet, Refills: 1    Associated Diagnoses: Nausea      PNV no.95/ferrous fum/folic ac (PRENATAL ORAL) Take by mouth.      azelastine (ASTELIN) 137 mcg (0.1 %) nasal spray       linaCLOtide (LINZESS) 72 mcg Cap capsule Take 1 capsule (72 mcg total) by mouth before breakfast.  Qty: 30 capsule, Refills: 5    Associated Diagnoses: Constipation, unspecified constipation type; Abdominal bloating      traZODone (DESYREL) 50 MG tablet Take 1 tablet (50 mg total) by mouth every evening. 1/2-1 tab at bedtime  Qty: 30 tablet, Refills: 5    Associated Diagnoses: Insomnia, unspecified type             Pa Velez MD  Obstetrics  [facility] - 2nd Floor Mother/Baby Unit

## 2024-08-18 ENCOUNTER — NURSE TRIAGE (OUTPATIENT)
Dept: ADMINISTRATIVE | Facility: CLINIC | Age: 24
End: 2024-08-18
Payer: COMMERCIAL

## 2024-08-18 NOTE — TELEPHONE ENCOUNTER
Patient is calling with laceration pain only when urinating. She states she has tried the sitz bath and pouring urine while urinating and with no relief. Advised per protocol to take tylenol/ibuprofen for the pain. Patient will call back or go to the ED if pain is not relieved.    Reason for Disposition   Episiotomy or vaginal laceration symptoms or questions    Additional Information   Negative: Sounds like a life-threatening emergency to the triager   Negative: [1] Widespread rash AND [2] bright red, sunburn-like   Negative: Fever 100.4 F (38.0 C) or higher   Negative: [1] Strong urge to urinate BUT [2] can't pass urine for > 4 hours (or can only pass few drops)   Negative: Patient sounds very sick or weak to the triager   Negative: [1] SEVERE perineum pain (e.g., excruciating pain, (area between vagina and anus) AND [2] not relieved by pain medications   Negative: [1] Episiotomy wound gaping open AND [2] length of opening > 1/2 inch (1 cm)   Negative: [1] Something is hanging out of the vagina AND [2] can't easily be pushed back inside   Negative: Foul smelling discharge from vagina (i.e., lochia) or perineum wound (episiotomy, vaginal laceration)   Negative: [1] Perineum pain (area between vagina and anus) is getting WORSE AND [2] > 48 hours since delivery   Negative: Clear or blood-tinged fluid draining from episiotomy   Negative: Fever present > 3 days (72 hours)   Negative: [1] Perineum pain (area between vagina and anus) AND [2] interferes with normal activities (e.g., walking, sitting) AND [3] > 14 days since delivery   Negative: [1] Vaginal pain with sexual intercourse AND [2] > 12 weeks since delivery   Negative: Feels like something inside is falling out of vagina (e.g., pressure, heaviness, fullness)    Protocols used: Postpartum - Episiotomy or Vaginal Laceration Symptoms-A-

## 2024-08-20 NOTE — PROGRESS NOTES
Please inform patient of negative confirmatory syphilis testing, initial testing was probably a false positive. Thank you

## 2025-03-17 ENCOUNTER — OFFICE VISIT (OUTPATIENT)
Dept: INTERNAL MEDICINE | Facility: CLINIC | Age: 25
End: 2025-03-17
Payer: COMMERCIAL

## 2025-03-17 VITALS
BODY MASS INDEX: 21.11 KG/M2 | HEIGHT: 63 IN | TEMPERATURE: 98 F | DIASTOLIC BLOOD PRESSURE: 68 MMHG | SYSTOLIC BLOOD PRESSURE: 104 MMHG | OXYGEN SATURATION: 100 % | RESPIRATION RATE: 20 BRPM | WEIGHT: 119.13 LBS | HEART RATE: 98 BPM

## 2025-03-17 DIAGNOSIS — Z11.9 SCREENING EXAMINATION FOR INFECTIOUS DISEASE: ICD-10-CM

## 2025-03-17 DIAGNOSIS — I47.10 PAROXYSMAL SVT (SUPRAVENTRICULAR TACHYCARDIA): ICD-10-CM

## 2025-03-17 DIAGNOSIS — I36.1 NONRHEUMATIC TRICUSPID VALVE REGURGITATION: ICD-10-CM

## 2025-03-17 DIAGNOSIS — G47.9 DIFFICULTY SLEEPING: ICD-10-CM

## 2025-03-17 DIAGNOSIS — R53.83 FATIGUE, UNSPECIFIED TYPE: ICD-10-CM

## 2025-03-17 DIAGNOSIS — Z00.00 WELLNESS EXAMINATION: Primary | ICD-10-CM

## 2025-03-17 DIAGNOSIS — R61 CHRONIC NIGHT SWEATS: ICD-10-CM

## 2025-03-17 PROBLEM — Z37.9 NORMAL LABOR: Status: RESOLVED | Noted: 2024-08-16 | Resolved: 2025-03-17

## 2025-03-17 PROCEDURE — 99215 OFFICE O/P EST HI 40 MIN: CPT | Mod: PBBFAC | Performed by: NURSE PRACTITIONER

## 2025-03-17 RX ORDER — BISOPROLOL FUMARATE 5 MG/1
2.5 TABLET, FILM COATED ORAL NIGHTLY
COMMUNITY
Start: 2024-12-24 | End: 2025-03-17 | Stop reason: SDUPTHER

## 2025-03-17 RX ORDER — BISOPROLOL FUMARATE 5 MG/1
2.5 TABLET, FILM COATED ORAL NIGHTLY
Qty: 30 TABLET | Refills: 5 | Status: SHIPPED | OUTPATIENT
Start: 2025-03-17

## 2025-03-17 NOTE — ASSESSMENT & PLAN NOTE
3/17/2025     1:37 PM   EPWORTH SLEEPINESS SCALE TOTAL SCORE    Total score 19     Previously recommended to undergo sleep study but was unable to afford at the time   Pt endorses chronic fatigue, night sweats, poor sleep habits and daytime fatigue   Appreciates referral

## 2025-03-17 NOTE — PROGRESS NOTES
Alis L Irma, NP   [facility]  [address] [place] [address]      PATIENT NAME: Andreina Dotson  : 2000  DATE: 3/17/25  MRN: [id number]        History of Present Illness / Problem Focused Workflow     Andreina Dotson presents to the clinic with Establish Care     23 yo female to establish pcp care. Former patient of [address] (last visit a few years ago). Active diagnosis of SVT, anemia. She was recently seen by Cardiologist, Dr. Gama, diagnosed with SVT after cardiac work up completed and prescribed bisoprolol 2.5 mg daily. She reports continues with fatigue. She denies any dizziness, CP, SOB or palpitations. She reports previously was recommended to have sleep study done but could not afford at the time. She does feel she is hungry a lot. She does endorse night sweats where she awakens with clothes wet most nights for a while now. Noted with anemia on last CBC. RPR + but repeat negative and per patient was informed likely false positive  as she has no concerns of STIs. Appreciates retesting with wellness lab work up. Endorses heavy menstrual cycles, usually regular and continues to see current OBGYN ( last seen 2024 for 6 week post op) as she delivered healthy baby girl 2024. Baby doing well. Patient works and is . Denies any fever, chills, HA, visual or hearing changes, abdominal pain, n/v/d, constipation.     Other providers   Dr. Jasmine OBGYN- appointment scheduled 10/1/25  Dr. Gama- Cardiology   [facility]     Review of Systems     Review of Systems   Constitutional:  Positive for appetite change, diaphoresis (night sweats) and fatigue.   HENT: Negative.     Eyes: Negative.    Respiratory: Negative.     Cardiovascular: Negative.    Gastrointestinal: Negative.    Endocrine: Negative.    Genitourinary: Negative.    Musculoskeletal: Negative.    Skin: Negative.    Allergic/Immunologic: Negative.    Neurological:  "Negative.    Hematological: Negative.    Psychiatric/Behavioral:  Positive for sleep disturbance.        Medical / Social / Family History     -------------------------------------    Known health problems: none    Pregnancy        Past Surgical History:   Procedure Laterality Date    CHOLECYSTECTOMY N/A 2016    EPIDURAL STEROID INJECTION INTO LUMBAR SPINE      WISDOM TOOTH EXTRACTION  2016       Social History[1]     Family History   Problem Relation Name Age of Onset    No Known Problems Mother      No Known Problems Father      Stroke Maternal Grandmother Dulce Can     Leukemia Maternal Grandfather Christophe Can     Heart failure Paternal Grandmother Jenn Dotson         Medications and Allergies     Medications  Current Outpatient Medications   Medication Instructions    azelastine (ASTELIN) 137 mcg (0.1 %) nasal spray     bisoprolol (ZEBETA) 2.5 mg, Oral, Nightly    linaCLOtide (LINZESS) 72 mcg, Oral, Before breakfast    PNV no.95/ferrous fum/folic ac (PRENATAL ORAL) Take by mouth.    traZODone (DESYREL) 50 mg, Oral, Nightly, 1/2-1 tab at bedtime       Allergies  Review of patient's allergies indicates:   Allergen Reactions    Shellfish containing products Itching    Sulfa (sulfonamide antibiotics) Hives       Physical Examination     Visit Vitals  /68   Pulse 98   Temp 97.7 °F (36.5 °C) (Oral)   Resp 20   Ht 5' 3" (1.6 m)   Wt 54 kg (119 lb 1.6 oz)   LMP 03/07/2025 (Exact Date)   SpO2 100%   Breastfeeding No   BMI 21.10 kg/m²       Physical Exam  Vitals and nursing note reviewed.   Constitutional:       General: She is not in acute distress.     Appearance: Normal appearance. She is normal weight. She is not ill-appearing, toxic-appearing or diaphoretic.   HENT:      Head: Normocephalic.      Right Ear: Tympanic membrane, ear canal and external ear normal.      Left Ear: Tympanic membrane, ear canal and external ear normal.   Neck:      Thyroid: No thyroid mass, thyromegaly or thyroid tenderness. "   Cardiovascular:      Rate and Rhythm: Normal rate and regular rhythm.      Pulses: Normal pulses.           Dorsalis pedis pulses are 2+ on the right side and 2+ on the left side.        Posterior tibial pulses are 2+ on the right side and 2+ on the left side.      Heart sounds: Murmur heard.   Pulmonary:      Effort: Pulmonary effort is normal. No respiratory distress.      Breath sounds: Normal breath sounds. No stridor. No wheezing, rhonchi or rales.   Abdominal:      General: Abdomen is flat. Bowel sounds are normal. There is no distension.      Palpations: Abdomen is soft. There is no mass.      Tenderness: There is no abdominal tenderness.      Hernia: No hernia is present.   Musculoskeletal:         General: Normal range of motion.      Cervical back: Neck supple.      Right lower leg: No edema.      Left lower leg: No edema.   Lymphadenopathy:      Cervical: No cervical adenopathy.   Skin:     General: Skin is warm and dry.      Capillary Refill: Capillary refill takes less than 2 seconds.   Neurological:      Mental Status: She is alert and oriented to person, place, and time. Mental status is at baseline.      Motor: No weakness.      Coordination: Coordination normal.      Gait: Gait normal.   Psychiatric:         Mood and Affect: Mood normal.         Behavior: Behavior normal.         Thought Content: Thought content normal.         Judgment: Judgment normal.           Results     Lab Results   Component Value Date    WBC 11.71 (H) 08/13/2024    RBC 3.34 (L) 08/13/2024    HGB 8.8 (L) 08/14/2024    HCT 26.4 (L) 08/14/2024    MCV 86.2 08/13/2024    MCH 28.1 08/13/2024    MCHC 32.6 (L) 08/13/2024    RDW 12.7 08/13/2024     08/13/2024    MPV 9.3 08/13/2024     Sodium   Date Value Ref Range Status   07/05/2024 137 136 - 145 mmol/L Final     Potassium   Date Value Ref Range Status   07/05/2024 3.8 3.5 - 5.1 mmol/L Final     Chloride   Date Value Ref Range Status   07/05/2024 108 (H) 98 - 107 mmol/L  "Final     CO2   Date Value Ref Range Status   07/05/2024 18 (L) 22 - 29 mmol/L Final     Blood Urea Nitrogen   Date Value Ref Range Status   07/05/2024 4.4 (L) 7.0 - 18.7 mg/dL Final     Creatinine   Date Value Ref Range Status   07/05/2024 0.60 0.55 - 1.02 mg/dL Final     Calcium   Date Value Ref Range Status   07/05/2024 9.1 8.4 - 10.2 mg/dL Final     Albumin   Date Value Ref Range Status   07/05/2024 2.7 (L) 3.5 - 5.0 g/dL Final     Bilirubin Total   Date Value Ref Range Status   07/05/2024 0.3 <=1.5 mg/dL Final     ALP   Date Value Ref Range Status   07/05/2024 138 40 - 150 unit/L Final     AST   Date Value Ref Range Status   07/05/2024 15 5 - 34 unit/L Final     ALT   Date Value Ref Range Status   07/05/2024 9 0 - 55 unit/L Final     Estimated GFR-Non    Date Value Ref Range Status   12/18/2020 >60 mL/min/1.73 m2 Final     No results found for: "CHOL"  No results found for: "HDL"  No results found for: "TRIG"  No results found for: "LDL"  Lab Results   Component Value Date    TSH 0.696 07/20/2023     Lab Results   Component Value Date    PHUR 6.5 07/05/2024    SPECGRAV 1.030 05/28/2021    PROTEINUA Negative 07/05/2024    GLUCUA Normal 07/05/2024    KETONESU Negative 06/14/2021    OCCULTUA Negative 07/05/2024    NITRITE Negative 07/05/2024    LEUKOCYTESUR 25 (A) 07/05/2024     No results found for: "HGBA1C"  No results found for: "MICALBCREAT"     Assessment       ICD-10-CM ICD-9-CM   1. Wellness examination  Z00.00 V70.0   2. Screening examination for infectious disease  Z11.9 V75.9   3. Paroxysmal SVT (supraventricular tachycardia)  I47.10 427.0   4. Fatigue, unspecified type  R53.83 780.79   5. Chronic night sweats  R61 780.8   6. Difficulty sleeping  G47.9 780.50   7. Nonrheumatic tricuspid valve regurgitation  I36.1 424.2       Plan       Problem List Items Addressed This Visit          Cardiac/Vascular    Nonrheumatic tricuspid valve regurgitation    Overview   Echo 12/2024 Dr. Francois Gama " Cardiologist   Trace TR         Current Assessment & Plan   Pt denies follow up with Cardiologist   On Bisoprolol 2.5 mg daily for SVT          Paroxysmal SVT (supraventricular tachycardia)    Current Assessment & Plan   Refer to Echo results 12/204 (Dr. Francois Gama Cardiologist)  On Bisoprolol 2.5 mg once daily   Denies palpitations, dizziness, SOB but continues to endorse fatigue         Relevant Medications    bisoprolol (ZEBETA) 5 MG tablet       Other    Chronic night sweats    Relevant Orders    Ambulatory referral/consult to Sleep Disorders    Polysomnogram (CPAP will be added if patient meets diagnostic criteria.)    CBC Auto Differential    Comprehensive Metabolic Panel    Hemoglobin A1C    TSH    Ferritin    Iron and TIBC    T3, Free ([facility])    T4, Free    Difficulty sleeping    Current Assessment & Plan       3/17/2025     1:37 PM   EPWORTH SLEEPINESS SCALE TOTAL SCORE    Total score 19     Previously recommended to undergo sleep study but was unable to afford at the time   Pt endorses chronic fatigue, night sweats, poor sleep habits and daytime fatigue   Appreciates referral          Relevant Orders    Ambulatory referral/consult to Sleep Disorders    Polysomnogram (CPAP will be added if patient meets diagnostic criteria.)    TSH    T3, Free ([facility])    T4, Free    Fatigue    Current Assessment & Plan   See SVT/ TR, difficulty sleeping  Referral to sleep lab  Labs          Relevant Orders    Ambulatory referral/consult to Sleep Disorders    Polysomnogram (CPAP will be added if patient meets diagnostic criteria.)    CBC Auto Differential    Comprehensive Metabolic Panel    Hemoglobin A1C    Lipid Panel    TSH    Vitamin D    Vitamin B12    Ferritin    Iron and TIBC    T3, Free ([facility])    T4, Free     Other Visit Diagnoses         Wellness examination    -  Primary  Avoid tobacco/ alcohol/ drugs  Regular exercise as tolerated at least 5 days/ week of 30 minutes moderate intensity exercise (brisk walking) and 2 or  more days/ week of muscle strength activities (as tolerated).  Eat well balanced diet of fresh fruits/ vegetables, whole grains, lean meats and limit high carbohydrate foods.   Healthy lifestyle habits.  Regular eye/ dental exams as recommended    Screenings:   PAP- Dr. Jasmine, UTD  MMG N/A  CRC N/A    Fasting wellness labs ordered for baseline evaluation     Recommend to review/ research Tdap if would like to receive notify provider office     Vaccines as recommended      Relevant Orders    CBC Auto Differential    Comprehensive Metabolic Panel    Chlamydia/GC, PCR    Hemoglobin A1C    Hepatitis B Surface Antigen    Hepatitis C Antibody    HIV 1/2 Ag/Ab (4th Gen)    Lipid Panel    SYPHILIS ANTIBODY (WITH REFLEX RPR)    TSH    Vitamin D      Screening examination for infectious disease        Relevant Orders    Chlamydia/GC, PCR    Hepatitis B Surface Antigen    Hepatitis C Antibody    HIV 1/2 Ag/Ab (4th Gen)    SYPHILIS ANTIBODY (WITH REFLEX RPR)            Future Appointments   Date Time Provider Department Center   5/1/2025 12:00 PM Alis Solis NP [facility]   10/1/2025  1:30 PM Nighat Warren, PA [facility]        Follow up in about 3 weeks (around 4/7/2025) for virtual for fasting lab results .    Signature:  Alis Solis NP  [facility]  [address] [facility] [address]    Date of encounter: 3/17/25         [1]   Social History  Socioeconomic History    Marital status:      Spouse name: Beto    Number of children: 1   Occupational History    Occupation: Billing     Comment: [employer]   Tobacco Use    Smoking status: Never    Smokeless tobacco: Never   Substance and Sexual Activity    Alcohol use: Not Currently    Drug use: Never    Sexual activity: Yes     Partners: Male     Social Drivers of Health     Financial Resource Strain: Low Risk  (3/12/2025)    Overall Financial Resource Strain (CARDIA)      Difficulty of Paying Living Expenses: Not very hard   Food Insecurity: No Food Insecurity (3/12/2025)    Hunger Vital Sign     Worried About Running Out of Food in the Last Year: Never true     Ran Out of Food in the Last Year: Never true   Transportation Needs: No Transportation Needs (3/12/2025)    PRAPARE - Transportation     Lack of Transportation (Medical): No     Lack of Transportation (Non-Medical): No   Physical Activity: Unknown (3/12/2025)    Exercise Vital Sign     Days of Exercise per Week: 0 days   Stress: No Stress Concern Present (3/12/2025)    [employer] - Occupational Stress Questionnaire     Feeling of Stress : Not at all   Housing Stability: Low Risk  (3/12/2025)    Housing Stability Vital Sign     Unable to Pay for Housing in the Last Year: No     Number of Times Moved in the Last Year: 0     Homeless in the Last Year: No

## 2025-03-17 NOTE — ASSESSMENT & PLAN NOTE
Refer to Echo results 12/204 (Dr. Francois Gama Cardiologist)  On Bisoprolol 2.5 mg once daily   Denies palpitations, dizziness, SOB but continues to endorse fatigue

## 2025-03-20 ENCOUNTER — LAB VISIT (OUTPATIENT)
Dept: LAB | Facility: HOSPITAL | Age: 25
End: 2025-03-20
Attending: NURSE PRACTITIONER
Payer: COMMERCIAL

## 2025-03-20 DIAGNOSIS — Z00.00 WELLNESS EXAMINATION: ICD-10-CM

## 2025-03-20 DIAGNOSIS — Z11.9 SCREENING EXAMINATION FOR INFECTIOUS DISEASE: ICD-10-CM

## 2025-03-20 DIAGNOSIS — R61 CHRONIC NIGHT SWEATS: ICD-10-CM

## 2025-03-20 DIAGNOSIS — G47.9 DIFFICULTY SLEEPING: ICD-10-CM

## 2025-03-20 DIAGNOSIS — R53.83 FATIGUE, UNSPECIFIED TYPE: ICD-10-CM

## 2025-03-20 LAB
25(OH)D3+25(OH)D2 SERPL-MCNC: 29 NG/ML (ref 30–80)
ALBUMIN SERPL-MCNC: 4.3 G/DL (ref 3.5–5)
ALBUMIN/GLOB SERPL: 1.4 RATIO (ref 1.1–2)
ALP SERPL-CCNC: 67 UNIT/L (ref 40–150)
ALT SERPL-CCNC: 11 UNIT/L (ref 0–55)
ANION GAP SERPL CALC-SCNC: 5 MEQ/L
AST SERPL-CCNC: 15 UNIT/L (ref 5–34)
BASOPHILS # BLD AUTO: 0.03 X10(3)/MCL
BASOPHILS NFR BLD AUTO: 0.4 %
BILIRUB SERPL-MCNC: 0.7 MG/DL
BUN SERPL-MCNC: 13.7 MG/DL (ref 7–18.7)
C TRACH DNA SPEC QL NAA+PROBE: NOT DETECTED
CALCIUM SERPL-MCNC: 9.5 MG/DL (ref 8.4–10.2)
CHLORIDE SERPL-SCNC: 105 MMOL/L (ref 98–107)
CHOLEST SERPL-MCNC: 183 MG/DL
CHOLEST/HDLC SERPL: 3 {RATIO} (ref 0–5)
CO2 SERPL-SCNC: 28 MMOL/L (ref 22–29)
CREAT SERPL-MCNC: 0.7 MG/DL (ref 0.55–1.02)
CREAT/UREA NIT SERPL: 20
EOSINOPHIL # BLD AUTO: 0.07 X10(3)/MCL (ref 0–0.9)
EOSINOPHIL NFR BLD AUTO: 1 %
ERYTHROCYTE [DISTWIDTH] IN BLOOD BY AUTOMATED COUNT: 12.8 % (ref 11.5–17)
EST. AVERAGE GLUCOSE BLD GHB EST-MCNC: 114 MG/DL
FERRITIN SERPL-MCNC: 11.3 NG/ML (ref 4.63–204)
GFR SERPLBLD CREATININE-BSD FMLA CKD-EPI: >60 ML/MIN/1.73/M2
GLOBULIN SER-MCNC: 3.1 GM/DL (ref 2.4–3.5)
GLUCOSE SERPL-MCNC: 102 MG/DL (ref 74–100)
HBA1C MFR BLD: 5.6 %
HBV SURFACE AG SERPL QL IA: NONREACTIVE
HCT VFR BLD AUTO: 43.7 % (ref 37–47)
HCV AB SERPL QL IA: NONREACTIVE
HDLC SERPL-MCNC: 62 MG/DL (ref 35–60)
HGB BLD-MCNC: 14.1 G/DL (ref 12–16)
HIV 1+2 AB+HIV1 P24 AG SERPL QL IA: NONREACTIVE
IMM GRANULOCYTES # BLD AUTO: 0.02 X10(3)/MCL (ref 0–0.04)
IMM GRANULOCYTES NFR BLD AUTO: 0.3 %
IRON SATN MFR SERPL: 37 % (ref 20–50)
IRON SERPL-MCNC: 145 UG/DL (ref 50–170)
LDLC SERPL CALC-MCNC: 100 MG/DL (ref 50–140)
LYMPHOCYTES # BLD AUTO: 1.92 X10(3)/MCL (ref 0.6–4.6)
LYMPHOCYTES NFR BLD AUTO: 27.5 %
MCH RBC QN AUTO: 27.7 PG (ref 27–31)
MCHC RBC AUTO-ENTMCNC: 32.3 G/DL (ref 33–36)
MCV RBC AUTO: 85.9 FL (ref 80–94)
MONOCYTES # BLD AUTO: 0.68 X10(3)/MCL (ref 0.1–1.3)
MONOCYTES NFR BLD AUTO: 9.7 %
N GONORRHOEA DNA SPEC QL NAA+PROBE: NOT DETECTED
NEUTROPHILS # BLD AUTO: 4.26 X10(3)/MCL (ref 2.1–9.2)
NEUTROPHILS NFR BLD AUTO: 61.1 %
NRBC BLD AUTO-RTO: 0 %
PLATELET # BLD AUTO: 372 X10(3)/MCL (ref 130–400)
PMV BLD AUTO: 9.2 FL (ref 7.4–10.4)
POTASSIUM SERPL-SCNC: 4.4 MMOL/L (ref 3.5–5.1)
PROT SERPL-MCNC: 7.4 GM/DL (ref 6.4–8.3)
RBC # BLD AUTO: 5.09 X10(6)/MCL (ref 4.2–5.4)
SODIUM SERPL-SCNC: 138 MMOL/L (ref 136–145)
SOURCE (OHS): NORMAL
T PALLIDUM AB SER QL: NONREACTIVE
T3FREE SERPL-MCNC: 3.58 PG/ML (ref 1.58–3.91)
T4 FREE SERPL-MCNC: 1.06 NG/DL (ref 0.7–1.48)
TIBC SERPL-MCNC: 249 UG/DL (ref 70–310)
TIBC SERPL-MCNC: 394 UG/DL (ref 250–450)
TRANSFERRIN SERPL-MCNC: 345 MG/DL (ref 180–382)
TRIGL SERPL-MCNC: 105 MG/DL (ref 37–140)
TSH SERPL-ACNC: 0.82 UIU/ML (ref 0.35–4.94)
VIT B12 SERPL-MCNC: 479 PG/ML (ref 213–816)
VLDLC SERPL CALC-MCNC: 21 MG/DL
WBC # BLD AUTO: 6.98 X10(3)/MCL (ref 4.5–11.5)

## 2025-03-20 PROCEDURE — 84443 ASSAY THYROID STIM HORMONE: CPT

## 2025-03-20 PROCEDURE — 83036 HEMOGLOBIN GLYCOSYLATED A1C: CPT

## 2025-03-20 PROCEDURE — 80061 LIPID PANEL: CPT

## 2025-03-20 PROCEDURE — 87491 CHLMYD TRACH DNA AMP PROBE: CPT

## 2025-03-20 PROCEDURE — 82607 VITAMIN B-12: CPT

## 2025-03-20 PROCEDURE — 82728 ASSAY OF FERRITIN: CPT

## 2025-03-20 PROCEDURE — 83550 IRON BINDING TEST: CPT

## 2025-03-20 PROCEDURE — 87340 HEPATITIS B SURFACE AG IA: CPT

## 2025-03-20 PROCEDURE — 36415 COLL VENOUS BLD VENIPUNCTURE: CPT

## 2025-03-20 PROCEDURE — 85025 COMPLETE CBC W/AUTO DIFF WBC: CPT

## 2025-03-20 PROCEDURE — 82306 VITAMIN D 25 HYDROXY: CPT

## 2025-03-20 PROCEDURE — 87389 HIV-1 AG W/HIV-1&-2 AB AG IA: CPT

## 2025-03-20 PROCEDURE — 86780 TREPONEMA PALLIDUM: CPT

## 2025-03-20 PROCEDURE — 80053 COMPREHEN METABOLIC PANEL: CPT

## 2025-03-20 PROCEDURE — 86803 HEPATITIS C AB TEST: CPT

## 2025-03-20 PROCEDURE — 84481 FREE ASSAY (FT-3): CPT

## 2025-03-20 PROCEDURE — 84439 ASSAY OF FREE THYROXINE: CPT

## 2025-03-27 ENCOUNTER — RESULTS FOLLOW-UP (OUTPATIENT)
Dept: INTERNAL MEDICINE | Facility: CLINIC | Age: 25
End: 2025-03-27
Payer: COMMERCIAL

## 2025-05-01 ENCOUNTER — TELEPHONE (OUTPATIENT)
Dept: INTERNAL MEDICINE | Facility: CLINIC | Age: 25
End: 2025-05-01
Payer: COMMERCIAL

## 2025-05-01 ENCOUNTER — OFFICE VISIT (OUTPATIENT)
Dept: INTERNAL MEDICINE | Facility: CLINIC | Age: 25
End: 2025-05-01
Payer: COMMERCIAL

## 2025-05-01 DIAGNOSIS — I47.10 PAROXYSMAL SVT (SUPRAVENTRICULAR TACHYCARDIA): ICD-10-CM

## 2025-05-01 DIAGNOSIS — R53.83 FATIGUE, UNSPECIFIED TYPE: Primary | ICD-10-CM

## 2025-05-01 DIAGNOSIS — G47.9 DIFFICULTY SLEEPING: ICD-10-CM

## 2025-05-01 DIAGNOSIS — I36.1 NONRHEUMATIC TRICUSPID VALVE REGURGITATION: ICD-10-CM

## 2025-05-01 DIAGNOSIS — R23.3 EASY BRUISING: ICD-10-CM

## 2025-05-01 DIAGNOSIS — R61 CHRONIC NIGHT SWEATS: ICD-10-CM

## 2025-05-01 DIAGNOSIS — I34.0 MILD MITRAL VALVE REGURGITATION: ICD-10-CM

## 2025-05-01 PROCEDURE — 98006 SYNCH AUDIO-VIDEO EST MOD 30: CPT | Mod: 95,,, | Performed by: NURSE PRACTITIONER

## 2025-05-01 PROCEDURE — 1159F MED LIST DOCD IN RCRD: CPT | Mod: CPTII,95,, | Performed by: NURSE PRACTITIONER

## 2025-05-01 PROCEDURE — 3044F HG A1C LEVEL LT 7.0%: CPT | Mod: CPTII,95,, | Performed by: NURSE PRACTITIONER

## 2025-05-01 NOTE — ASSESSMENT & PLAN NOTE
Echo 12/2024 trace TR  Seen by Cardiologist 3/28/25, on bisoprolol 2.5 mg qhs ; f/u as recommended

## 2025-05-01 NOTE — TELEPHONE ENCOUNTER
Please send blood work results from 3/20/25 to pt cardiologist per request to Dr. Francois Gama at [facility] (see notes in chart from cardiologist).     Thank you

## 2025-05-01 NOTE — ASSESSMENT & PLAN NOTE
Pt with fatigue, night sweats, easy bruising and difficulty sleeping  Keep appointment for sleep study   Labs reviewed and overall unremarkable  Echo 12/2024 without diastolic dysfunction and mild TR noted; on bisoprolol for paroxysmal SVT  Ongoing fatigue, night sweats, easy bruising for years. Denies any wt loss  Recheck blood work with f/u visit and f/u after sleep study

## 2025-05-01 NOTE — ASSESSMENT & PLAN NOTE
Echo 12/2024  Evaluated by Cardiologist 3/28/25 without changes; continue on bisoprolol 2.5 mg nightly   Keep f/u with Cardiologist as scheduled

## 2025-05-01 NOTE — PROGRESS NOTES
The patient location is: home  The chief complaint leading to consultation is: follow up results     Visit type: audiovisual    Face to Face time with patient: 7 minutes   25 minutes of total time spent on the encounter, which includes face to face time and non-face to face time preparing to see the patient (eg, review of tests), Obtaining and/or reviewing separately obtained history, Documenting clinical information in the electronic or other health record, Independently interpreting results (not separately reported) and communicating results to the patient/family/caregiver, or Care coordination (not separately reported).         Each patient to whom he or she provides medical services by telemedicine is:  (1) informed of the relationship between the physician and patient and the respective role of any other health care provider with respect to management of the patient; and (2) notified that he or she may decline to receive medical services by telemedicine and may withdraw from such care at any time.    Notes: Initial visit 3/17/25.   5/1/25: Pt for lab review. Vit D 29. Ferritin wnl. Vit b 12 wnl 479. A1c 5.6%. TSH 0.825. She has upcoming sleep study end of this month. Was seen by cardiologist 3/28/25 without changes. Notes and echo reviewed. She reports continued fatigue, difficulty sleeping, night sweats, easy bruising. Denies any fever, chills, wt loss or known family history of bleeding disorders.     Other providers  Dr. Mitali ELDER- appointment 10/1/25   Dr. Francois Gama Cardiology  [facility]     Medication List with Changes/Refills   Current Medications    AZELASTINE (ASTELIN) 137 MCG (0.1 %) NASAL SPRAY        BISOPROLOL (ZEBETA) 5 MG TABLET    Take 0.5 tablets (2.5 mg total) by mouth every evening.    LINACLOTIDE (LINZESS) 72 MCG CAP CAPSULE    Take 1 capsule (72 mcg total) by mouth before breakfast.    PNV NO.95/FERROUS FUM/FOLIC AC (PRENATAL ORAL)    Take by mouth.    TRAZODONE (DESYREL)  50 MG TABLET    Take 1 tablet (50 mg total) by mouth every evening. 1/2-1 tab at bedtime     Problem List Items Addressed This Visit          Cardiac/Vascular    Mild mitral valve regurgitation    Current Assessment & Plan   Echo 12/2024  Evaluated by Cardiologist 3/28/25 without changes; continue on bisoprolol 2.5 mg nightly   Keep f/u with Cardiologist as scheduled          Nonrheumatic tricuspid valve regurgitation    Current Assessment & Plan   Echo 12/2024 trace TR  Seen by Cardiologist 3/28/25, on bisoprolol 2.5 mg qhs ; f/u as recommended          Paroxysmal SVT (supraventricular tachycardia)    Current Assessment & Plan   Asymptomatic. On bisprolol 2.5 mg qhs. Est with Cardiology; recent visit 3/28/25, continue meds and f/u as recommended             Hematology    Easy bruising    Relevant Orders    CBC Auto Differential    C-Reactive Protein    TSH    X-Ray Chest PA And Lateral    SAY by IFA, w/Rflx    Path Review, Peripheral Smear       Other    Chronic night sweats    Relevant Orders    CBC Auto Differential    C-Reactive Protein    TSH    X-Ray Chest PA And Lateral    SAY by IFA, w/Rflx    Path Review, Peripheral Smear    Difficulty sleeping    Current Assessment & Plan   She has sleep study scheduled end of this month            Fatigue - Primary    Current Assessment & Plan   Pt with fatigue, night sweats, easy bruising and difficulty sleeping  Keep appointment for sleep study   Labs reviewed and overall unremarkable  Echo 12/2024 without diastolic dysfunction and mild TR noted; on bisoprolol for paroxysmal SVT  Ongoing fatigue, night sweats, easy bruising for years. Denies any wt loss  Recheck blood work with f/u visit and f/u after sleep study             Relevant Orders    CBC Auto Differential    C-Reactive Protein    TSH    X-Ray Chest PA And Lateral    SAY by IFA, w/Rflx    Path Review, Peripheral Smear     Follow up in about 2 months (around 7/1/2025) for virtual for fatigue, night sweats, easy  bruising and sleep study results .

## 2025-05-01 NOTE — ASSESSMENT & PLAN NOTE
Asymptomatic. On bisprolol 2.5 mg qhs. Est with Cardiology; recent visit 3/28/25, continue meds and f/u as recommended

## 2025-05-02 NOTE — TELEPHONE ENCOUNTER
Faxed lab work to [facility] . Will fax successful confirmation in pt's media once received.

## 2025-05-09 ENCOUNTER — TELEPHONE (OUTPATIENT)
Dept: INTERNAL MEDICINE | Facility: CLINIC | Age: 25
End: 2025-05-09
Payer: COMMERCIAL

## 2025-05-09 NOTE — TELEPHONE ENCOUNTER
Copied from CRM #[id number]. Topic: Appointments - Amb Referral  >> May 5, 2025  9:30 AM Jack wrote:  Who Called: Andreina Dotson    What order is the patient requesting: Blood work   When does the expect the orders to be performed? ASAP        Preferred Method of Contact: Phone Call  Patient's Preferred Phone Number on File: [phone]   Best Call Back Number, if different:  Additional Information: Labs need to be authorized in chart.

## 2025-05-20 ENCOUNTER — TELEPHONE (OUTPATIENT)
Dept: INTERNAL MEDICINE | Facility: CLINIC | Age: 25
End: 2025-05-20
Payer: COMMERCIAL

## 2025-05-20 NOTE — TELEPHONE ENCOUNTER
Copied from CRM #[id number]. Topic: General Inquiry - Patient Advice  >> May 19, 2025 11:57 AM Lynda wrote:  .Who Called: Andreina Dotson    Caller is requesting assistance/information from provider's office.    Symptoms (please be specific): pt thinks that she is having a kidney infection or uti. Wants labs to be called in at [facility]   How long has patient had these symptoms:  last Wednesday night   List of preferred pharmacies on file (remove unneeded): [unfilled]  If different, enter pharmacy into here including location and phone number: n/a      Preferred Method of Contact: Phone Call  Patient's Preferred Phone Number on File: [phone]   Best Call Back Number, if different:  Additional Information: .

## 2025-05-20 NOTE — TELEPHONE ENCOUNTER
Copied from CRM #[id number]. Topic: General Inquiry - Patient Advice  >> May 16, 2025  8:13 AM Jack wrote:  Who Called: Andreina Dotson    Caller is requesting assistance/information from provider's office.    Symptoms (please be specific): Abdomen pain,chills,weak  How long has patient had these symptoms:  last night  List of preferred pharmacies on file (remove unneeded): [unfilled]  If different, enter pharmacy into here including location and phone number:       Preferred Method of Contact: Phone Call  Patient's Preferred Phone Number on File: [phone]   Best Call Back Number, if different:  Additional Information: PT states that she is having abdominal pain, back pain, chills, weakness, and had a point where she felt like she was going to pass out. Needs to come in, or advice. Please advise.

## 2025-05-20 NOTE — TELEPHONE ENCOUNTER
Contacted she informed me that she went to the emergency room yesterday diagnosed  with kidney stone.

## 2025-05-20 NOTE — TELEPHONE ENCOUNTER
Recommend if continues with symptoms to report back to ER where she was treated. I do not see anything in her chart where she was seen.

## 2025-05-22 NOTE — TELEPHONE ENCOUNTER
Contacted she informed me that she has not passed any stones. She said she feel like the medication is not working and with the amount of water she's drinking- not urinating that much. I encouraged her to return to the emergency room for further evaluation.She voiced understanding.  Requested  records from [facility].

## 2025-05-30 ENCOUNTER — TELEPHONE (OUTPATIENT)
Dept: INTERNAL MEDICINE | Facility: CLINIC | Age: 25
End: 2025-05-30
Payer: COMMERCIAL

## 2025-05-30 NOTE — TELEPHONE ENCOUNTER
"Contacted concerning discoloration under her eyes. She stated ' I still have not passed the kidney stone. I wonder if that could be a side effect"  I suggested that she go to urgent care to be re evaluated.She voiced understanding.  "

## 2025-06-02 ENCOUNTER — HOSPITAL ENCOUNTER (OUTPATIENT)
Dept: RADIOLOGY | Facility: HOSPITAL | Age: 25
Discharge: HOME | End: 2025-06-02
Attending: NURSE PRACTITIONER
Payer: COMMERCIAL

## 2025-06-02 DIAGNOSIS — R61 CHRONIC NIGHT SWEATS: ICD-10-CM

## 2025-06-02 DIAGNOSIS — R23.3 EASY BRUISING: ICD-10-CM

## 2025-06-02 DIAGNOSIS — R53.83 FATIGUE, UNSPECIFIED TYPE: ICD-10-CM

## 2025-06-02 PROCEDURE — 71046 X-RAY EXAM CHEST 2 VIEWS: CPT | Mod: TC

## 2025-06-13 ENCOUNTER — PROCEDURE VISIT (OUTPATIENT)
Dept: SLEEP MEDICINE | Facility: HOSPITAL | Age: 25
End: 2025-06-13
Attending: NURSE PRACTITIONER
Payer: COMMERCIAL

## 2025-06-13 DIAGNOSIS — R53.83 FATIGUE, UNSPECIFIED TYPE: ICD-10-CM

## 2025-06-13 DIAGNOSIS — R61 CHRONIC NIGHT SWEATS: ICD-10-CM

## 2025-06-13 DIAGNOSIS — G47.9 DIFFICULTY SLEEPING: ICD-10-CM

## 2025-06-13 PROCEDURE — 95810 POLYSOM 6/> YRS 4/> PARAM: CPT

## 2025-07-01 ENCOUNTER — OFFICE VISIT (OUTPATIENT)
Dept: INTERNAL MEDICINE | Facility: CLINIC | Age: 25
End: 2025-07-01
Payer: COMMERCIAL

## 2025-07-01 DIAGNOSIS — E28.2 PCOS (POLYCYSTIC OVARIAN SYNDROME): ICD-10-CM

## 2025-07-01 DIAGNOSIS — R53.83 FATIGUE, UNSPECIFIED TYPE: Primary | ICD-10-CM

## 2025-07-01 PROCEDURE — 98006 SYNCH AUDIO-VIDEO EST MOD 30: CPT | Mod: 95,,, | Performed by: NURSE PRACTITIONER

## 2025-07-01 PROCEDURE — 1159F MED LIST DOCD IN RCRD: CPT | Mod: CPTII,95,, | Performed by: NURSE PRACTITIONER

## 2025-07-01 PROCEDURE — 3044F HG A1C LEVEL LT 7.0%: CPT | Mod: CPTII,95,, | Performed by: NURSE PRACTITIONER

## 2025-07-01 PROCEDURE — 1160F RVW MEDS BY RX/DR IN RCRD: CPT | Mod: CPTII,95,, | Performed by: NURSE PRACTITIONER

## 2025-07-01 NOTE — PROGRESS NOTES
Answers submitted by the patient for this visit:  Review of Systems Questionnaire (Submitted on 7/1/2025)  activity change: No  unexpected weight change: No  neck pain: No  hearing loss: No  rhinorrhea: No  trouble swallowing: No  eye discharge: No  visual disturbance: No  chest tightness: No  wheezing: No  chest pain: No  palpitations: No  blood in stool: No  constipation: No  vomiting: No  diarrhea: No  polydipsia: No  polyuria: No  difficulty urinating: No  hematuria: No  menstrual problem: No  dysuria: No  joint swelling: No  arthralgias: No  headaches: No  weakness: No  confusion: No  dysphoric mood: No    The patient location is: work  The chief complaint leading to consultation is: follow up results     Visit type: audiovisual    Face to Face time with patient: 10 minutes   17 minutes of total time spent on the encounter, which includes face to face time and non-face to face time preparing to see the patient (eg, review of tests), Obtaining and/or reviewing separately obtained history, Documenting clinical information in the electronic or other health record, Independently interpreting results (not separately reported) and communicating results to the patient/family/caregiver, or Care coordination (not separately reported).         Each patient to whom he or she provides medical services by telemedicine is:  (1) informed of the relationship between the physician and patient and the respective role of any other health care provider with respect to management of the patient; and (2) notified that he or she may decline to receive medical services by telemedicine and may withdraw from such care at any time.    Notes:   5/1/25 virtual: Pt for lab review. Vit D 29. Ferritin wnl. Vit b 12 wnl 479. A1c 5.6%. TSH 0.825. She has upcoming sleep study end of this month. Was seen by cardiologist 3/28/25 without changes. Notes and echo reviewed. She reports continued fatigue, difficulty sleeping, night sweats, easy  bruising. Denies any fever, chills, wt loss or known family history of bleeding disorders.     Today 7/1/25 virtual: Pt for follow up for result discussion. Reviewed sleep study and blood work. CXR negative. Reports diagnosed with PCOS by GYN yesterday. Admits to poor dietary habits. Processed foods. Has upcoming blood work and glucose testing per gyn provider next week with f/u visit with provider and US in 6 weeks.     Other providers  Dr. Mitali ELDER- visit 6/30/25   Dr. Francois [facility]  [facility]     Medication List with Changes/Refills   Current Medications    AZELASTINE (ASTELIN) 137 MCG (0.1 %) NASAL SPRAY        BISOPROLOL (ZEBETA) 5 MG TABLET    Take 0.5 tablets (2.5 mg total) by mouth every evening.    LINACLOTIDE (LINZESS) 72 MCG CAP CAPSULE    Take 1 capsule (72 mcg total) by mouth before breakfast.    PNV NO.95/FERROUS FUM/FOLIC AC (PRENATAL ORAL)    Take by mouth.    TRAZODONE (DESYREL) 50 MG TABLET    Take 1 tablet (50 mg total) by mouth every evening. 1/2-1 tab at bedtime     Problem List Items Addressed This Visit          Endocrine    PCOS (polycystic ovarian syndrome)    Current Assessment & Plan   Pt reports new diagnosis of PCOS per GYN provider yesterday. Has upcoming glucose test and labs with US and f/u with provider in 6 weeks.   Lab Results   Component Value Date    HGBA1C 5.6 03/20/2025     Reports failed initial glucose testing with pregnancy but passed second test though she noted 2 of her levels were elevated   Advised to research dietary recommendations for PCOS, eliminate processed foods, encourage regular exercise/ healthy sleep habits, reduce stress  Advised on risk of CV Disease, DM            Other    Fatigue - Primary    Current Assessment & Plan   She reports continued fatigue with new diagnosis of PCOS per GYN provider yesterday. She has upcoming glucose test and blood work next Monday to complete with recheck in 6 weeks with GYN provider.  Blood work to  date overall unremarkable  CXR negative  Sleep study negative for [place] findings   See new diagnosis of PCOS- encouraged dietary recommendations, regular exercise  F/u 3 mo or sooner if symptoms persist or worsen              Follow up in about 3 months (around 10/1/2025) for virtual fatigue, pcos follow up.

## 2025-07-01 NOTE — ASSESSMENT & PLAN NOTE
Pt reports new diagnosis of PCOS per GYN provider yesterday. Has upcoming glucose test and labs with US and f/u with provider in 6 weeks.   Lab Results   Component Value Date    HGBA1C 5.6 03/20/2025     Reports failed initial glucose testing with pregnancy but passed second test though she noted 2 of her levels were elevated   Advised to research dietary recommendations for PCOS, eliminate processed foods, encourage regular exercise/ healthy sleep habits, reduce stress  Advised on risk of CV Disease, DM

## 2025-07-01 NOTE — ASSESSMENT & PLAN NOTE
She reports continued fatigue with new diagnosis of PCOS per GYN provider yesterday. She has upcoming glucose test and blood work next Monday to complete with recheck in 6 weeks with GYN provider.  Blood work to date overall unremarkable  CXR negative  Sleep study negative for [place] findings   See new diagnosis of PCOS- encouraged dietary recommendations, regular exercise  F/u 3 mo or sooner if symptoms persist or worsen

## 2025-07-29 ENCOUNTER — APPOINTMENT (OUTPATIENT)
Dept: LAB | Facility: HOSPITAL | Age: 25
End: 2025-07-29
Attending: OBSTETRICS & GYNECOLOGY
Payer: COMMERCIAL

## 2025-07-29 DIAGNOSIS — R30.0 DYSURIA: Primary | ICD-10-CM

## 2025-07-29 LAB
BACTERIA #/AREA URNS AUTO: ABNORMAL /HPF
BILIRUB UR QL STRIP.AUTO: NEGATIVE
CLARITY UR: ABNORMAL
COLOR UR AUTO: YELLOW
GLUCOSE UR QL STRIP: NORMAL
HGB UR QL STRIP: ABNORMAL
KETONES UR QL STRIP: NEGATIVE
LEUKOCYTE ESTERASE UR QL STRIP: 250
MUCOUS THREADS URNS QL MICRO: ABNORMAL /LPF
NITRITE UR QL STRIP: NEGATIVE
PH UR STRIP: 5.5 [PH]
PROT UR QL STRIP: ABNORMAL
RBC #/AREA URNS AUTO: ABNORMAL /HPF
SP GR UR STRIP.AUTO: 1.03 (ref 1–1.03)
SQUAMOUS #/AREA URNS LPF: ABNORMAL /HPF
UROBILINOGEN UR STRIP-ACNC: NORMAL
WBC #/AREA URNS AUTO: ABNORMAL /HPF
YEAST BUDDING URNS QL: ABNORMAL /HPF

## 2025-07-29 PROCEDURE — 81015 MICROSCOPIC EXAM OF URINE: CPT

## 2025-07-29 PROCEDURE — 87086 URINE CULTURE/COLONY COUNT: CPT

## 2025-07-31 LAB
BACTERIA UR CULT: ABNORMAL
BACTERIA UR CULT: ABNORMAL